# Patient Record
Sex: MALE | Race: WHITE | NOT HISPANIC OR LATINO | Employment: OTHER | URBAN - METROPOLITAN AREA
[De-identification: names, ages, dates, MRNs, and addresses within clinical notes are randomized per-mention and may not be internally consistent; named-entity substitution may affect disease eponyms.]

---

## 2017-06-12 ENCOUNTER — OFFICE VISIT (OUTPATIENT)
Dept: AUDIOLOGY | Facility: CLINIC | Age: 67
End: 2017-06-12
Payer: COMMERCIAL

## 2017-06-12 DIAGNOSIS — H90.3 SENSORINEURAL HEARING LOSS, ASYMMETRICAL: Primary | ICD-10-CM

## 2017-06-12 PROCEDURE — V5299 HEARING SERVICE: HCPCS | Performed by: AUDIOLOGIST

## 2017-06-12 NOTE — PROGRESS NOTES
67 year old male comes in for a hearing aid check. He reports his right Unitron 2013 Moxi Kiss hearing aid is not working. The hearing aids were purchased at another facility that went out of business.     Replaced right  and hearing aid is still dead. Listening check of the left hearing aid reveals distortion with limited gain.     Sent both hearing aids in for warranty repair (expires: 15/2/2017). See chart in the hearing aid room.     Patient will be contacted when the hearing aids are ready to be picked up.    Alicja MURRY, #[id number]

## 2017-06-12 NOTE — MR AVS SNAPSHOT
"              After Visit Summary   2017    Taj Velazquez    MRN: [id number]           Patient Information     Date Of Birth          1950        Visit Information        Provider Department      2017 2:00 PM Alicja Velázquez AuD [facility]        Today's Diagnoses     Sensorineural hearing loss, asymmetrical    -  1       Follow-ups after your visit        Who to contact     If you have questions or need follow up information about today's clinic visit or your schedule please contact [facility] directly at [phone].  Normal or non-critical lab and imaging results will be communicated to you by [employer], letter or phone within 4 business days after the clinic has received the results. If you do not hear from us within 7 days, please contact the clinic through [employer] or phone. If you have a critical or abnormal lab result, we will notify you by phone as soon as possible.  Submit refill requests through [employer] or call your pharmacy and they will forward the refill request to us. Please allow 3 business days for your refill to be completed.          Additional Information About Your Visit        MyChart Information     [employer] lets you send messages to your doctor, view your test results, renew your prescriptions, schedule appointments and more. To sign up, go to [email or web address] . Click on \"Log in\" on the left side of the screen, which will take you to the Welcome page. Then click on \"Sign up Now\" on the right side of the page.     You will be asked to enter the access code listed below, as well as some personal information. Please follow the directions to create your username and password.     Your access code is: [email or web address]  Expires: 9/10/2017  3:18 PM     Your access code will  in 90 days. If you need help or a new code, please call your [facility] or [phone].        Care EveryWhere ID     This is your Care EveryWhere ID. This could be used " by other organizations to access your [place] medical records  [id number]         Blood Pressure from Last 3 Encounters:   12/01/11 141/91    Weight from Last 3 Encounters:   12/01/11 240 lb (108.9 kg)              We Performed the Following     HEARING AID CHECK/NO CHARGE        Primary Care Provider Office Phone # Fax #    Javianaid David Carnes [phone]       [address] [address]        Thank you!     Thank you for choosing [facility]  for your care. Our goal is always to provide you with excellent care. Hearing back from our patients is one way we can continue to improve our services. Please take a few minutes to complete the written survey that you may receive in the mail after your visit with us. Thank you!             Your Updated Medication List - Protect others around you: Learn how to safely use, store and throw away your medicines at www.disposemymeds.org.          This list is accurate as of: 6/12/17  3:18 PM.  Always use your most recent med list.                   Brand Name Dispense Instructions for use    amLODIPine 5 MG tablet    NORVASC     Take 5 mg by mouth daily.       fluticasone 50 MCG/ACT spray    FLONASE     2 sprays by Both Nostrils route daily.       lisinopril-hydrochlorothiazide 20-25 MG per tablet    PRINZIDE/ZESTORETIC     Take 1 tablet by mouth daily.

## 2017-06-20 ENCOUNTER — OFFICE VISIT (OUTPATIENT)
Dept: AUDIOLOGY | Facility: CLINIC | Age: 67
End: 2017-06-20
Payer: COMMERCIAL

## 2017-06-20 DIAGNOSIS — H90.3 SENSORY HEARING LOSS, BILATERAL: Primary | ICD-10-CM

## 2017-06-20 PROCEDURE — V5299 HEARING SERVICE: HCPCS | Performed by: AUDIOLOGIST

## 2017-06-20 NOTE — MR AVS SNAPSHOT
"              After Visit Summary   2017    Taj Velazquez    MRN: [id number]           Patient Information     Date Of Birth          1950        Visit Information        Provider Department      2017 11:30 AM Alicja Velázquez AuD [facility]        Today's Diagnoses     Sensory hearing loss, bilateral    -  1       Follow-ups after your visit        Who to contact     If you have questions or need follow up information about today's clinic visit or your schedule please contact [facility] directly at [phone].  Normal or non-critical lab and imaging results will be communicated to you by [employer], letter or phone within 4 business days after the clinic has received the results. If you do not hear from us within 7 days, please contact the clinic through [employer] or phone. If you have a critical or abnormal lab result, we will notify you by phone as soon as possible.  Submit refill requests through [employer] or call your pharmacy and they will forward the refill request to us. Please allow 3 business days for your refill to be completed.          Additional Information About Your Visit        MyChart Information     [employer] lets you send messages to your doctor, view your test results, renew your prescriptions, schedule appointments and more. To sign up, go to [email or web address] . Click on \"Log in\" on the left side of the screen, which will take you to the Welcome page. Then click on \"Sign up Now\" on the right side of the page.     You will be asked to enter the access code listed below, as well as some personal information. Please follow the directions to create your username and password.     Your access code is: [email or web address]  Expires: 9/10/2017  3:18 PM     Your access code will  in 90 days. If you need help or a new code, please call your [facility]] or [phone].        Care EveryWhere ID     This is your Care EveryWhere ID. This could be used by other " organizations to access your [place] medical records  [id number]         Blood Pressure from Last 3 Encounters:   12/01/11 141/91    Weight from Last 3 Encounters:   12/01/11 240 lb (108.9 kg)              We Performed the Following     HEARING AID CHECK/NO CHARGE        Primary Care Provider Office Phone # Fax #    Javianaid David Carnes [phone]       [place] [address] [facility] [address]        Thank you!     Thank you for choosing [facility]  for your care. Our goal is always to provide you with excellent care. Hearing back from our patients is one way we can continue to improve our services. Please take a few minutes to complete the written survey that you may receive in the mail after your visit with us. Thank you!             Your Updated Medication List - Protect others around you: Learn how to safely use, store and throw away your medicines at www.disposemymeds.org.          This list is accurate as of: 6/20/17 11:51 AM.  Always use your most recent med list.                   Brand Name Dispense Instructions for use    amLODIPine 5 MG tablet    NORVASC     Take 5 mg by mouth daily.       fluticasone 50 MCG/ACT spray    FLONASE     2 sprays by Both Nostrils route daily.       lisinopril-hydrochlorothiazide 20-25 MG per tablet    PRINZIDE/ZESTORETIC     Take 1 tablet by mouth daily.

## 2017-06-20 NOTE — PROGRESS NOTES
67 year old male comes in to  his repaired 2013 Unitron Moxi Kiss hearing aids. The hearing aids were purchased at another facility.    Verified hearing aid functionality.  Reprogrammed hearing aids to use settings. See chart in the hearing aid room.     Will  hearing aids at the specialty clinic .     Return to clinic as needed.    Alicja MURRY, #[id number]

## 2017-11-27 ENCOUNTER — RECORDS - HEALTHEAST (OUTPATIENT)
Dept: ADMINISTRATIVE | Facility: OTHER | Age: 67
End: 2017-11-27

## 2017-12-07 ASSESSMENT — MIFFLIN-ST. JEOR: SCORE: 1839.48

## 2017-12-13 ENCOUNTER — SURGERY - HEALTHEAST (OUTPATIENT)
Dept: SURGERY | Facility: CLINIC | Age: 67
End: 2017-12-13

## 2017-12-13 ENCOUNTER — ANESTHESIA - HEALTHEAST (OUTPATIENT)
Dept: SURGERY | Facility: CLINIC | Age: 67
End: 2017-12-13

## 2017-12-13 ASSESSMENT — MIFFLIN-ST. JEOR
SCORE: 1839.48
SCORE: 1841.29

## 2018-11-19 ASSESSMENT — MIFFLIN-ST. JEOR: SCORE: 1839.48

## 2018-11-26 ENCOUNTER — ANESTHESIA - HEALTHEAST (OUTPATIENT)
Dept: SURGERY | Facility: CLINIC | Age: 68
End: 2018-11-26

## 2018-11-26 ENCOUNTER — SURGERY - HEALTHEAST (OUTPATIENT)
Dept: SURGERY | Facility: CLINIC | Age: 68
End: 2018-11-26

## 2018-11-26 ASSESSMENT — MIFFLIN-ST. JEOR: SCORE: 1862.72

## 2021-01-02 ENCOUNTER — HOSPITAL ENCOUNTER (EMERGENCY)
Facility: CLINIC | Age: 71
Discharge: ANOTHER HOSPITAL | End: 2021-01-02
Attending: FAMILY MEDICINE | Admitting: FAMILY MEDICINE
Payer: COMMERCIAL

## 2021-01-02 ENCOUNTER — APPOINTMENT (OUTPATIENT)
Dept: CT IMAGING | Facility: CLINIC | Age: 71
End: 2021-01-02
Attending: FAMILY MEDICINE
Payer: COMMERCIAL

## 2021-01-02 ENCOUNTER — APPOINTMENT (OUTPATIENT)
Dept: GENERAL RADIOLOGY | Facility: CLINIC | Age: 71
End: 2021-01-02
Attending: FAMILY MEDICINE
Payer: COMMERCIAL

## 2021-01-02 VITALS
HEART RATE: 79 BPM | DIASTOLIC BLOOD PRESSURE: 85 MMHG | HEIGHT: 72 IN | OXYGEN SATURATION: 95 % | TEMPERATURE: 98.3 F | RESPIRATION RATE: 17 BRPM | BODY MASS INDEX: 31.15 KG/M2 | WEIGHT: 230 LBS | SYSTOLIC BLOOD PRESSURE: 138 MMHG

## 2021-01-02 DIAGNOSIS — R09.02 HYPOXIA: ICD-10-CM

## 2021-01-02 DIAGNOSIS — U07.1 CLINICAL DIAGNOSIS OF COVID-19: ICD-10-CM

## 2021-01-02 PROBLEM — R33.9 RETENTION OF URINE: Status: ACTIVE | Noted: 2018-11-27

## 2021-01-02 PROBLEM — D62 ANEMIA DUE TO BLOOD LOSS, ACUTE: Status: ACTIVE | Noted: 2018-11-27

## 2021-01-02 PROBLEM — Z87.891 HISTORY OF TOBACCO USE DISORDER: Status: ACTIVE | Noted: 2018-11-26

## 2021-01-02 LAB
ALBUMIN SERPL-MCNC: 2.5 G/DL (ref 3.4–5)
ALP SERPL-CCNC: 72 U/L (ref 40–150)
ALT SERPL W P-5'-P-CCNC: 125 U/L (ref 0–70)
ANION GAP SERPL CALCULATED.3IONS-SCNC: 6 MMOL/L (ref 3–14)
AST SERPL W P-5'-P-CCNC: 77 U/L (ref 0–45)
BASOPHILS # BLD AUTO: 0 10E9/L (ref 0–0.2)
BASOPHILS NFR BLD AUTO: 0.2 %
BILIRUB SERPL-MCNC: 2 MG/DL (ref 0.2–1.3)
BUN SERPL-MCNC: 20 MG/DL (ref 7–30)
CALCIUM SERPL-MCNC: 8.1 MG/DL (ref 8.5–10.1)
CHLORIDE SERPL-SCNC: 95 MMOL/L (ref 94–109)
CO2 SERPL-SCNC: 30 MMOL/L (ref 20–32)
CREAT SERPL-MCNC: 0.89 MG/DL (ref 0.66–1.25)
D DIMER PPP FEU-MCNC: 2.4 UG/ML FEU (ref 0–0.5)
DIFFERENTIAL METHOD BLD: ABNORMAL
EOSINOPHIL # BLD AUTO: 0.1 10E9/L (ref 0–0.7)
EOSINOPHIL NFR BLD AUTO: 1.3 %
ERYTHROCYTE [DISTWIDTH] IN BLOOD BY AUTOMATED COUNT: 12 % (ref 10–15)
FLUABV+SARS-COV-2+RSV PNL RESP NAA+PROBE: NEGATIVE
FLUABV+SARS-COV-2+RSV PNL RESP NAA+PROBE: NEGATIVE
GFR SERPL CREATININE-BSD FRML MDRD: 87 ML/MIN/{1.73_M2}
GLUCOSE SERPL-MCNC: 281 MG/DL (ref 70–99)
HCT VFR BLD AUTO: 35.6 % (ref 40–53)
HGB BLD-MCNC: 12.6 G/DL (ref 13.3–17.7)
IMM GRANULOCYTES # BLD: 0 10E9/L (ref 0–0.4)
IMM GRANULOCYTES NFR BLD: 0.5 %
LABORATORY COMMENT REPORT: ABNORMAL
LYMPHOCYTES # BLD AUTO: 0.5 10E9/L (ref 0.8–5.3)
LYMPHOCYTES NFR BLD AUTO: 8.6 %
MCH RBC QN AUTO: 30 PG (ref 26.5–33)
MCHC RBC AUTO-ENTMCNC: 35.4 G/DL (ref 31.5–36.5)
MCV RBC AUTO: 85 FL (ref 78–100)
MONOCYTES # BLD AUTO: 0.7 10E9/L (ref 0–1.3)
MONOCYTES NFR BLD AUTO: 11.4 %
NEUTROPHILS # BLD AUTO: 4.7 10E9/L (ref 1.6–8.3)
NEUTROPHILS NFR BLD AUTO: 78 %
NRBC # BLD AUTO: 0 10*3/UL
NRBC BLD AUTO-RTO: 0 /100
PLATELET # BLD AUTO: 267 10E9/L (ref 150–450)
POTASSIUM SERPL-SCNC: 3.9 MMOL/L (ref 3.4–5.3)
PROT SERPL-MCNC: 7.3 G/DL (ref 6.8–8.8)
RBC # BLD AUTO: 4.2 10E12/L (ref 4.4–5.9)
RSV RNA SPEC QL NAA+PROBE: ABNORMAL
SARS-COV-2 RNA SPEC QL NAA+PROBE: POSITIVE
SODIUM SERPL-SCNC: 131 MMOL/L (ref 133–144)
SPECIMEN SOURCE: ABNORMAL
TROPONIN I SERPL-MCNC: <0.015 UG/L (ref 0–0.04)
WBC # BLD AUTO: 6.1 10E9/L (ref 4–11)

## 2021-01-02 PROCEDURE — 85025 COMPLETE CBC W/AUTO DIFF WBC: CPT | Performed by: FAMILY MEDICINE

## 2021-01-02 PROCEDURE — 87636 SARSCOV2 & INF A&B AMP PRB: CPT | Performed by: FAMILY MEDICINE

## 2021-01-02 PROCEDURE — 96365 THER/PROPH/DIAG IV INF INIT: CPT | Mod: 59 | Performed by: FAMILY MEDICINE

## 2021-01-02 PROCEDURE — 258N000003 HC RX IP 258 OP 636: Performed by: FAMILY MEDICINE

## 2021-01-02 PROCEDURE — 71045 X-RAY EXAM CHEST 1 VIEW: CPT

## 2021-01-02 PROCEDURE — 99285 EMERGENCY DEPT VISIT HI MDM: CPT | Mod: 25 | Performed by: FAMILY MEDICINE

## 2021-01-02 PROCEDURE — 250N000011 HC RX IP 250 OP 636: Performed by: FAMILY MEDICINE

## 2021-01-02 PROCEDURE — 96375 TX/PRO/DX INJ NEW DRUG ADDON: CPT | Mod: 59 | Performed by: FAMILY MEDICINE

## 2021-01-02 PROCEDURE — 85379 FIBRIN DEGRADATION QUANT: CPT | Performed by: FAMILY MEDICINE

## 2021-01-02 PROCEDURE — 84484 ASSAY OF TROPONIN QUANT: CPT | Performed by: FAMILY MEDICINE

## 2021-01-02 PROCEDURE — 94640 AIRWAY INHALATION TREATMENT: CPT | Performed by: FAMILY MEDICINE

## 2021-01-02 PROCEDURE — 71275 CT ANGIOGRAPHY CHEST: CPT

## 2021-01-02 PROCEDURE — 250N000013 HC RX MED GY IP 250 OP 250 PS 637: Performed by: FAMILY MEDICINE

## 2021-01-02 PROCEDURE — 250N000009 HC RX 250: Performed by: FAMILY MEDICINE

## 2021-01-02 PROCEDURE — 93010 ELECTROCARDIOGRAM REPORT: CPT | Performed by: FAMILY MEDICINE

## 2021-01-02 PROCEDURE — 93005 ELECTROCARDIOGRAM TRACING: CPT | Performed by: FAMILY MEDICINE

## 2021-01-02 PROCEDURE — C9803 HOPD COVID-19 SPEC COLLECT: HCPCS | Performed by: FAMILY MEDICINE

## 2021-01-02 PROCEDURE — 80053 COMPREHEN METABOLIC PANEL: CPT | Performed by: FAMILY MEDICINE

## 2021-01-02 RX ORDER — DEXAMETHASONE SODIUM PHOSPHATE 10 MG/ML
10 INJECTION, SOLUTION INTRAMUSCULAR; INTRAVENOUS ONCE
Status: DISCONTINUED | OUTPATIENT
Start: 2021-01-02 | End: 2021-01-02

## 2021-01-02 RX ORDER — ALBUTEROL SULFATE 90 UG/1
2 AEROSOL, METERED RESPIRATORY (INHALATION) EVERY 6 HOURS PRN
Status: DISCONTINUED | OUTPATIENT
Start: 2021-01-02 | End: 2021-01-02 | Stop reason: HOSPADM

## 2021-01-02 RX ORDER — LOSARTAN POTASSIUM 25 MG/1
25 TABLET ORAL DAILY
COMMUNITY
End: 2024-01-28

## 2021-01-02 RX ORDER — DEXAMETHASONE SODIUM PHOSPHATE 10 MG/ML
6 INJECTION, SOLUTION INTRAMUSCULAR; INTRAVENOUS ONCE
Status: COMPLETED | OUTPATIENT
Start: 2021-01-02 | End: 2021-01-02

## 2021-01-02 RX ORDER — IOPAMIDOL 755 MG/ML
95 INJECTION, SOLUTION INTRAVASCULAR ONCE
Status: COMPLETED | OUTPATIENT
Start: 2021-01-02 | End: 2021-01-02

## 2021-01-02 RX ADMIN — IOPAMIDOL 95 ML: 755 INJECTION, SOLUTION INTRAVENOUS at 15:17

## 2021-01-02 RX ADMIN — REMDESIVIR 200 MG: 100 INJECTION, POWDER, LYOPHILIZED, FOR SOLUTION INTRAVENOUS at 17:00

## 2021-01-02 RX ADMIN — DEXAMETHASONE SODIUM PHOSPHATE 6 MG: 10 INJECTION, SOLUTION INTRAMUSCULAR; INTRAVENOUS at 16:51

## 2021-01-02 RX ADMIN — ALBUTEROL SULFATE 2 PUFF: 90 AEROSOL, METERED RESPIRATORY (INHALATION) at 13:52

## 2021-01-02 ASSESSMENT — ENCOUNTER SYMPTOMS
COUGH: 0
FEVER: 0
DIAPHORESIS: 0
FREQUENCY: 0
DIARRHEA: 1
HEADACHES: 1
APPETITE CHANGE: 1
SHORTNESS OF BREATH: 1
WHEEZING: 0
NAUSEA: 0
PALPITATIONS: 0
ABDOMINAL PAIN: 0
VOMITING: 0
CONSTIPATION: 0
SORE THROAT: 0
BLOOD IN STOOL: 0
DYSURIA: 0
CHILLS: 0
SINUS PRESSURE: 0
FATIGUE: 1

## 2021-01-02 ASSESSMENT — MIFFLIN-ST. JEOR: SCORE: 1851.27

## 2021-01-02 NOTE — ED TRIAGE NOTES
penny from home  Healthy male  Contact with his son 12/19 who was symptomatic and tested positive the following day  Loss of appetite, fatigue, dry cough, hard to breathe in, can't catch his breath  Generalized weakness  States 2 weeks of symptoms  89% on RA with exertion  98% 3L  92% RA at rest  , states ws borderline diabetic 15 years ago   States he sees his PCP every year  Only drinking water for over a week, unable to take in food

## 2021-01-02 NOTE — ED PROVIDER NOTES
History   No chief complaint on file.    HPI  Taj Velazquez is a 70 year old male who presents with exposures to Covid in the family starting with onset before 20 December of exposure and then as of 20 December experienced onset of cough shortness of breath congestion and progressive symptoms that mostly became worse in the last several days.  His cough results some episodes of lower chest pain with this.  No palpitations.  No sweats.  No change in sense of smell or taste.  Diarrhea.  No vomiting.  Decreased appetite decreased p.o. intake.  Has had progressive dyspnea on exertion and difficulty doing any activity at home.    Oxygen saturations on arrival after activity was 88% at rest she was as low as 90% and when I go to the room to evaluate him he is on oxygen 2 L at 95%        Allergies:  Allergies   Allergen Reactions     Nkda [No Known Drug Allergies]        Problem List:    There are no active problems to display for this patient.       Past Medical History:    No past medical history on file.    Past Surgical History:    Past Surgical History:   Procedure Laterality Date     COLONOSCOPY  12/1/2011    Procedure:COLONOSCOPY; Colonoscopy; Surgeon:GWYN MELVIN; Location:[facility]       Family History:    No family history on file.    Social History:  Marital Status:   [2]  Social History     Tobacco Use     Smoking status: Not on file   Substance Use Topics     Alcohol use: Not on file     Drug use: Not on file        Medications:         amLODIPine (NORVASC) 5 MG tablet       fluticasone (FLONASE) 50 MCG/ACT nasal spray       lisinopril-hydrochlorothiazide (PRINZIDE,ZESTORETIC) 20-25 MG per tablet          Review of Systems   Constitutional: Positive for appetite change and fatigue. Negative for chills, diaphoresis and fever.   HENT: Positive for congestion. Negative for ear pain, sinus pressure and sore throat.    Eyes: Negative for visual disturbance.   Respiratory: Positive for shortness of  breath. Negative for cough and wheezing.    Cardiovascular: Negative for chest pain and palpitations.   Gastrointestinal: Positive for diarrhea. Negative for abdominal pain, blood in stool, constipation, nausea and vomiting.   Genitourinary: Negative for dysuria, frequency and urgency.   Skin: Negative for rash.   Neurological: Positive for headaches.   All other systems reviewed and are negative.      Physical Exam   BP: (!) 147/78  Pulse: 90  Resp: 22  SpO2: 93 %      Physical Exam  Constitutional:       General: He is in acute distress.      Appearance: He is not diaphoretic.   HENT:      Head: Atraumatic.      Nose: No rhinorrhea.   Eyes:      Conjunctiva/sclera: Conjunctivae normal.   Neck:      Musculoskeletal: Neck supple.   Cardiovascular:      Rate and Rhythm: Normal rate and regular rhythm.      Heart sounds: No murmur.   Pulmonary:      Effort: Tachypnea and respiratory distress present.      Breath sounds: No stridor. No wheezing or rhonchi.   Abdominal:      General: Abdomen is flat. Bowel sounds are normal. There is no distension.      Palpations: There is no mass.      Tenderness: There is no abdominal tenderness. There is no guarding.   Musculoskeletal:      Right lower leg: No edema.      Left lower leg: No edema.   Skin:     Coloration: Skin is not pale.      Findings: No rash.   Neurological:      General: No focal deficit present.      Mental Status: He is alert.      Motor: No weakness.         ED Course        Procedures                 EKG Interpretation:      Interpreted by David Ro MD  EKG done at 1416 hrs. demonstrates a sinus rhythm of 87 bpm with a normal axis and no ST change.  No T wave changes.  Poor R progression V1 through V3.  No ectopy.  Normal conduction.  Normal intervals.  Impression sinus rhythm 87 bpm likely prior anterior septal MI.  No prior EKG.    Critical Care time:  none               Results for orders placed or performed during the hospital encounter of 01/02/21  (from the past 24 hour(s))   CBC with platelets differential   Result Value Ref Range    WBC 6.1 4.0 - 11.0 10e9/L    RBC Count 4.20 (L) 4.4 - 5.9 10e12/L    Hemoglobin 12.6 (L) 13.3 - 17.7 g/dL    Hematocrit 35.6 (L) 40.0 - 53.0 %    MCV 85 78 - 100 fl    MCH 30.0 26.5 - 33.0 pg    MCHC 35.4 31.5 - 36.5 g/dL    RDW 12.0 10.0 - 15.0 %    Platelet Count 267 150 - 450 10e9/L    Diff Method Automated Method     % Neutrophils 78.0 %    % Lymphocytes 8.6 %    % Monocytes 11.4 %    % Eosinophils 1.3 %    % Basophils 0.2 %    % Immature Granulocytes 0.5 %    Nucleated RBCs 0 0 /100    Absolute Neutrophil 4.7 1.6 - 8.3 10e9/L    Absolute Lymphocytes 0.5 (L) 0.8 - 5.3 10e9/L    Absolute Monocytes 0.7 0.0 - 1.3 10e9/L    Absolute Eosinophils 0.1 0.0 - 0.7 10e9/L    Absolute Basophils 0.0 0.0 - 0.2 10e9/L    Abs Immature Granulocytes 0.0 0 - 0.4 10e9/L    Absolute Nucleated RBC 0.0    Comprehensive metabolic panel   Result Value Ref Range    Sodium 131 (L) 133 - 144 mmol/L    Potassium 3.9 3.4 - 5.3 mmol/L    Chloride 95 94 - 109 mmol/L    Carbon Dioxide 30 20 - 32 mmol/L    Anion Gap 6 3 - 14 mmol/L    Glucose 281 (H) 70 - 99 mg/dL    Urea Nitrogen 20 7 - 30 mg/dL    Creatinine 0.89 0.66 - 1.25 mg/dL    GFR Estimate 87 >60 mL/min/[1.73_m2]    GFR Estimate If Black >90 >60 mL/min/[1.73_m2]    Calcium 8.1 (L) 8.5 - 10.1 mg/dL    Bilirubin Total 2.0 (H) 0.2 - 1.3 mg/dL    Albumin 2.5 (L) 3.4 - 5.0 g/dL    Protein Total 7.3 6.8 - 8.8 g/dL    Alkaline Phosphatase 72 40 - 150 U/L     (H) 0 - 70 U/L    AST 77 (H) 0 - 45 U/L   Symptomatic Influenza A/B & SARS-CoV2 (COVID-19) Virus PCR Multiplex    Specimen: Nasopharyngeal   Result Value Ref Range    Flu A/B & SARS-COV-2 PCR Source Nasopharyngeal     SARS-CoV-2 PCR Result POSITIVE (AA)     Influenza A PCR Negative NEG^Negative    Influenza B PCR Negative NEG^Negative    Respiratory Syncytial Virus PCR (Note)     Flu A/B & SARS-CoV-2 PCR Comment (Note)    D dimer quantitative    Result Value Ref Range    D Dimer 2.4 (H) 0.0 - 0.50 ug/ml FEU   Troponin I   Result Value Ref Range    Troponin I ES <0.015 0.000 - 0.045 ug/L   XR Chest Port 1 View    Narrative    CHEST PORTABLE ONE VIEW   1/2/2021 1:40 PM     HISTORY: Dyspnea.    COMPARISON: None available      Impression    IMPRESSION: Single portable AP view of the chest was obtained. Stable  cardiomediastinal silhouette. Bilateral patchy airspace pulmonary  opacities, worrisome for infection including atypical infection like  COVID-19.    FABY SILVA MD   CT Chest Pulmonary Embolism w Contrast    Narrative    CT CHEST PULMONARY EMBOLISM WITH CONTRAST  1/2/2021 3:27 PM    HISTORY:  Dyspnea, COVID.    TECHNIQUE: Scans obtained from the apices through the diaphragm with  IV contrast. 95 mL Isovue-370 IV injected. Radiation dose for this  scan was reduced using automated exposure control, adjustment of the  mA and/or kV according to patient size, or iterative reconstruction  technique.    COMPARISON:  None available    FINDINGS:  Chest/mediastinum: No evidence of pulmonary embolism. No cardiomegaly  or significant pericardial effusion. Multiple enlarged mediastinal and  hilar lymph nodes, likely reactive.    Lungs and pleura: No pleural effusion or pneumothorax. Multiple patchy  basilar and peripheral predominant patchy pulmonary opacities,  worrisome for infection, including atypical infection like COVID-19.    Upper abdomen: Limited evaluation of the upper abdomen due to lack of  coverage and contrast. Diffuse hypoattenuation of the liver, likely  due to underlying hepatic steatosis. The spleen is enlarged measuring  at least 15.1 cm in craniocaudal dimension.    Bones and soft tissue: Multilevel degenerative changes of the spine.  No suspicious osseous lesion.      Impression    IMPRESSION:   1. No evidence of pulmonary embolism.  2. Extensive bilateral basilar and peripheral predominant patchy  airspace pulmonary opacities, worrisome  for infection, including  atypical infection like COVID-19.  3. Multiple enlarged mediastinal and hilar lymph nodes, likely  reactive.  4. Significant hepatic steatosis.  5. Splenomegaly.    FABY SILVA MD       Medications   albuterol (PROAIR HFA/PROVENTIL HFA/VENTOLIN HFA) 108 (90 Base) MCG/ACT inhaler 2 puff (2 puffs Inhalation Given 1/2/21 1352)   remdesivir 200 mg in sodium chloride 0.9 % 250 mL intermittent infusion (200 mg Intravenous New Bag 1/2/21 1700)     Followed by   remdesivir 100 mg in sodium chloride 0.9 % 250 mL intermittent infusion (has no administration in time range)   iopamidol (ISOVUE-370) solution 95 mL (95 mLs Intravenous Given 1/2/21 1517)   dexamethasone PF (DECADRON) injection 6 mg (6 mg Intravenous Given 1/2/21 1651)       Assessments & Plan (with Medical Decision Making)     MDM; Taj Velazquez is a 70 year old male who presents with acute Covid-like symptoms and exposure that occurred around December 20.  Then with developing increasing dyspnea on exertion cough in the last several days.  Will evaluate more broadly for his dyspnea.  His oxygen requirements are borderline here and may require hospitalization.  Will have him ambulate with oxygen saturation monitoring off of oxygen.     Patient has several risk factors for worse prognosis with COVID-19 including likely diabetes undiagnosed with elevated glucose to 218 prior diagnosis of prediabetes, age of 70 years old.  Likely fatty liver.  elevated LFTs -likely nonalcoholic fatty liver from his history.  He is hypoxic down to 88% on room air with any activity and at rest drops to about 90% when he is just lying in bed not doing anything.  He is improved on supplemental oxygen.  He is given Decadron and remdesivir.  He is given an albuterol inhaler which does not significantly modify hypoxia.  CT demonstrates changes consistent with COVID-19.    I discussed with the patient that we do not hospital beds.  I discussed with Covid  triage Dr. Yates, I have re accepts for transfer to [facility].  Discussed with the patient and he understands the need to transfer as we do not have beds.  I do not know how long he will be in the hospital for.  He may be able to leave on supplemental oxygen.  But at this point I feel that he is unsafe for discharge on supplemental oxygen due to his symptomatic ambulation and hypoxia currently along with his underlying risks.    Viewed the nursing notes.    I have reviewed the findings, diagnosis, plan and need for follow up with the patient.       New Prescriptions    No medications on file       Final diagnoses:   Clinical diagnosis of COVID-19   Hypoxia       1/2/2021   [facility] EMERGENCY DEPT     David Ro MD  01/02/21 6455

## 2021-04-05 ENCOUNTER — MEDICAL CORRESPONDENCE (OUTPATIENT)
Dept: HEALTH INFORMATION MANAGEMENT | Facility: CLINIC | Age: 71
End: 2021-04-05

## 2021-04-06 ENCOUNTER — TRANSCRIBE ORDERS (OUTPATIENT)
Dept: OTHER | Age: 71
End: 2021-04-06

## 2021-04-06 DIAGNOSIS — Z12.11 SCREEN FOR COLON CANCER: Primary | ICD-10-CM

## 2021-04-07 ENCOUNTER — HOSPITAL ENCOUNTER (OUTPATIENT)
Facility: CLINIC | Age: 71
End: 2021-04-07
Attending: SURGERY | Admitting: SURGERY
Payer: COMMERCIAL

## 2021-04-07 DIAGNOSIS — Z11.59 ENCOUNTER FOR SCREENING FOR OTHER VIRAL DISEASES: ICD-10-CM

## 2021-04-12 DIAGNOSIS — Z11.59 ENCOUNTER FOR SCREENING FOR OTHER VIRAL DISEASES: ICD-10-CM

## 2021-04-12 PROCEDURE — U0005 INFEC AGEN DETEC AMPLI PROBE: HCPCS | Performed by: SURGERY

## 2021-04-12 PROCEDURE — U0003 INFECTIOUS AGENT DETECTION BY NUCLEIC ACID (DNA OR RNA); SEVERE ACUTE RESPIRATORY SYNDROME CORONAVIRUS 2 (SARS-COV-2) (CORONAVIRUS DISEASE [COVID-19]), AMPLIFIED PROBE TECHNIQUE, MAKING USE OF HIGH THROUGHPUT TECHNOLOGIES AS DESCRIBED BY CMS-2020-01-R: HCPCS | Performed by: SURGERY

## 2021-04-13 LAB
LABORATORY COMMENT REPORT: ABNORMAL
SARS-COV-2 RNA RESP QL NAA+PROBE: NORMAL
SARS-COV-2 RNA RESP QL NAA+PROBE: POSITIVE
SPECIMEN SOURCE: ABNORMAL
SPECIMEN SOURCE: NORMAL

## 2021-04-13 RX ORDER — AMOXICILLIN 500 MG/1
CAPSULE ORAL
COMMUNITY
Start: 2020-06-29 | End: 2021-04-14 | Stop reason: HOSPADM

## 2021-04-14 ENCOUNTER — TELEPHONE (OUTPATIENT)
Dept: LAB | Facility: CLINIC | Age: 71
End: 2021-04-14

## 2021-04-14 NOTE — TELEPHONE ENCOUNTER
"Coronavirus (COVID-19) Notification    Caller Name (Patient, parent, daughter/son, grandparent, etc)  patient    Reason for call  Notify of Positive Coronavirus (COVID-19) lab results, assess symptoms,  review [facility] recommendations    Lab Result    Lab test:  2019-nCoV rRt-PCR or SARS-CoV-2 PCR    Oropharyngeal AND/OR nasopharyngeal swabs is POSITIVE for 2019-nCoV RNA/SARS-COV-2 PCR (COVID-19 virus)    RN Recommendations/Instructions per [facility] Coronavirus COVID-19 recommendations    Brief introduction script  Introduce self then review script:  \"I am calling on behalf of [employer].  We were notified that your Coronavirus test (COVID-19) for was POSITIVE for the virus.  I have some information to relay to you but first I wanted to mention that the [place] Dept of Health will be contacting you shortly [it's possible MD already called Patient] to talk to you more about how you are feeling and other people you have had contact with who might now also have the virus.  Also,  [facility] is Partnering with the [facility] for Covid-19 research, you may be contacted directly by research staff.\"    Assessment (Inquire about Patient's current symptoms)   Assessment   Current Symptoms at time of phone call: (if no symptoms, document No symptoms] No symptoms   Symptoms onset (if applicable) n/a     If at time of call, Patients symptoms hare worsened, the Patient should contact 911 or have someone drive them to Emergency Dept promptly:      If Patient calling 911, inform 911 personal that you have tested positive for the Coronavirus (COVID-19).  Place mask on and await 911 to arrive.  If Emergency Dept, If possible, please have another adult drive you to the Emergency Dept but you need to wear mask when in contact with other people.      Patient declines any education @ this time and is instructed to contact the surgeon whom ordered his Covid test.    (Exception, no letters sent to " Presurgerical/Preprocedure Patients)    Mone Mai, ONEILN

## 2021-05-31 VITALS — BODY MASS INDEX: 32.48 KG/M2 | HEIGHT: 71 IN | WEIGHT: 232 LBS

## 2021-06-01 ENCOUNTER — RECORDS - HEALTHEAST (OUTPATIENT)
Dept: ADMINISTRATIVE | Facility: CLINIC | Age: 71
End: 2021-06-01

## 2021-06-02 VITALS — BODY MASS INDEX: 33.2 KG/M2 | HEIGHT: 71 IN | WEIGHT: 237.13 LBS

## 2021-06-14 NOTE — ANESTHESIA PROCEDURE NOTES
Peripheral Block    Patient location during procedure: pre-op  Start time: 12/13/2017 6:57 AM  End time: 12/13/2017 7:00 AM  post-op analgesia per surgeon order as noted in medical record  Staffing:  Performing  Anesthesiologist: SURINDER SÁNCHEZ  Preanesthetic Checklist  Completed: patient identified, site marked, risks, benefits, and alternatives discussed, timeout performed, consent obtained, airway assessed, oxygen available, suction available, emergency drugs available and hand hygiene performed  Peripheral Block  Block type: other, tibial  Prep: ChloraPrep  Patient position: supine  Patient monitoring: cardiac monitor, continuous pulse oximetry, heart rate and blood pressure  Laterality: left  Injection technique: ultrasound guided          Needle  Needle type: Stimuplex   Needle gauge: 21 G  Needle length: 4 in  no peripheral nerve catheter placed  Assessment  Injection assessment: no difficulty with injection, negative aspiration for heme, no paresthesia on injection and incremental injection

## 2021-06-14 NOTE — ANESTHESIA POSTPROCEDURE EVALUATION
Patient: Taj Velazquez Sr.  LEFT TOTAL KNEE ARTHROPLASTY  Anesthesia type: spinal    Patient location: PACU  Last vitals:   Vitals:    12/13/17 1030   BP: 147/85   Pulse: 77   Resp: 16   Temp:    SpO2: 96%     Post vital signs: stable  Level of consciousness: awake and responds to simple questions  Post-anesthesia pain: pain controlled  Post-anesthesia nausea and vomiting: no  Pulmonary: unassisted, return to baseline  Cardiovascular: stable and blood pressure at baseline  Hydration: adequate  Anesthetic events: no    QCDR Measures:  ASA# 11 - Bonnie-op Cardiac Arrest: ASA11B - Patient did NOT experience unanticipated cardiac arrest  ASA# 12 - Bonnie-op Mortality Rate: ASA12B - Patient did NOT die  ASA# 13 - PACU Re-Intubation Rate: NA - No ETT / LMA used for case  ASA# 10 - Composite Anes Safety: ASA10A - No serious adverse event    Additional Notes:

## 2021-06-14 NOTE — ANESTHESIA PROCEDURE NOTES
Spinal Block    Patient location during procedure: OR  Start time: 12/13/2017 7:33 AM  End time: 12/13/2017 7:36 AM    Staffing:  Performing  Anesthesiologist: SURINDER SÁNCHEZ    Preanesthetic Checklist  Completed: patient identified, risks, benefits, and alternatives discussed, timeout performed, consent obtained, airway assessed, oxygen available, suction available, emergency drugs available and hand hygiene performed  Spinal Block  Patient position: sitting  Prep: ChloraPrep  Patient monitoring: heart rate, cardiac monitor, continuous pulse ox and blood pressure  Approach: midline  Location: L3-4  Injection technique: single-shot  Needle type: pencil-tip   Needle gauge: 24 G      Additional Notes:  Single pass. Neg paresthesia, neg. Heme.  Clear CSF

## 2021-06-14 NOTE — ANESTHESIA CARE TRANSFER NOTE
Last vitals:   Vitals:    12/13/17 0710   BP: 138/78   Pulse: 68   Resp: 15   Temp:    SpO2: 97%     Patient's level of consciousness is awake  Spontaneous respirations: yes  Maintains airway independently: yes  Dentition unchanged: yes  Oropharynx: oropharynx clear of all foreign objects    QCDR Measures:  ASA# 20 - Surgical Safety Checklist: WHO surgical safety checklist completed prior to induction  PQRS# 430 - Adult PONV Prevention: 4558F - Pt received => 2 anti-emetic agents (different classes) preop & intraop  ASA# 8 - Peds PONV Prevention: NA - Not pediatric patient, not GA or 2 or more risk factors NOT present  PQRS# 424 - Bonnie-op Temp Management: 4559F - At least one body temp DOCUMENTED => 35.5C or 95.9F within required timeframe  PQRS# 426 - PACU Transfer Protocol: - Transfer of care checklist used  ASA# 14 - Acute Post-op Pain: ASA14B - Patient did NOT experience pain >= 7 out of 10

## 2021-06-14 NOTE — ANESTHESIA PREPROCEDURE EVALUATION
Anesthesia Evaluation      Patient summary reviewed   No history of anesthetic complications     Airway   Mallampati: II  Neck ROM: full   Pulmonary - negative ROS and normal exam    breath sounds clear to auscultation                         Cardiovascular - negative ROS and normal exam  Exercise tolerance: > or = 4 METS  (+) hypertension well controlled, ,     ECG reviewed  Rhythm: regular  Rate: normal,         Neuro/Psych - negative ROS     Endo/Other - negative ROS      GI/Hepatic/Renal - negative ROS           Dental - normal exam                        Anesthesia Plan  Planned anesthetic: spinal and peripheral nerve block  Saphenous. n and Tibial n. Blocks for POA as requested by Dr. Baker  ASA 3     Anesthetic plan and risks discussed with: patient    Post-op plan: routine recovery

## 2021-06-14 NOTE — ANESTHESIA PROCEDURE NOTES
Peripheral Block    Patient location during procedure: pre-op  Start time: 12/13/2017 7:01 AM  End time: 12/13/2017 7:04 AM  post-op analgesia per surgeon order as noted in medical record  Staffing:  Performing  Anesthesiologist: SURINDER SÁNCHEZ  Preanesthetic Checklist  Completed: patient identified, site marked, risks, benefits, and alternatives discussed, timeout performed, consent obtained, airway assessed, oxygen available, suction available, emergency drugs available and hand hygiene performed  Peripheral Block  Block type: saphenous  Prep: ChloraPrep  Patient position: supine  Patient monitoring: cardiac monitor, continuous pulse oximetry, heart rate and blood pressure  Laterality: left  Injection technique: ultrasound guided          Needle  Needle type: Stimuplex   Needle gauge: 21 G  Needle length: 4 in  no peripheral nerve catheter placed  Assessment  Injection assessment: no difficulty with injection, negative aspiration for heme, no paresthesia on injection and incremental injection

## 2021-06-21 NOTE — ANESTHESIA PROCEDURE NOTES
Peripheral Block    Patient location during procedure: pre-op  Start time: 11/26/2018 10:36 AM  End time: 11/26/2018 10:39 AM  post-op analgesia per surgeon order as noted in medical record  Staffing:  Performing  Anesthesiologist: Endy Lopez MD  Preanesthetic Checklist  Completed: patient identified, site marked, risks, benefits, and alternatives discussed, timeout performed, consent obtained, airway assessed, oxygen available, suction available, emergency drugs available and hand hygiene performed  Peripheral Block  Block type: other, tibial  Prep: ChloraPrep  Patient position: supine  Patient monitoring: cardiac monitor, continuous pulse oximetry, heart rate and blood pressure  Laterality: right  Injection technique: ultrasound guided            Needle  Needle type: Stimuplex   Needle gauge: 20G  Needle length: 4 in  no peripheral nerve catheter placed  Assessment  Injection assessment: no difficulty with injection, negative aspiration for heme, no paresthesia on injection and incremental injection

## 2021-06-21 NOTE — ANESTHESIA POSTPROCEDURE EVALUATION
Patient: Taj Velazquez Sr.  RIGHT TOTAL KNEE ARTHROPLASTY  Anesthesia type: general    Patient location: PACU  Last vitals:   Vitals:    11/26/18 1320   BP: 159/77   Pulse: 75   Resp: 12   Temp:    SpO2: 95%     Post vital signs: stable  Level of consciousness: awake and responds to simple questions  Post-anesthesia pain: pain controlled  Post-anesthesia nausea and vomiting: no  Pulmonary: unassisted, return to baseline  Cardiovascular: stable and blood pressure at baseline  Hydration: adequate  Anesthetic events: no    QCDR Measures:  ASA# 11 - Bonnie-op Cardiac Arrest: ASA11B - Patient did NOT experience unanticipated cardiac arrest  ASA# 12 - Bonnie-op Mortality Rate: ASA12B - Patient did NOT die  ASA# 13 - PACU Re-Intubation Rate: ASA13B - Patient did NOT require a new airway mgmt  ASA# 10 - Composite Anes Safety: ASA10A - No serious adverse event    Additional Notes:

## 2021-06-21 NOTE — ANESTHESIA CARE TRANSFER NOTE
Last vitals:   Vitals:    11/26/18 1315   BP: 167/79   Pulse: 87   Resp: 16   Temp: 36.6  C (97.9  F)   SpO2: 96%     Patient's level of consciousness is drowsy  Spontaneous respirations: yes  Maintains airway independently: yes  Dentition unchanged: yes  Oropharynx: oropharynx clear of all foreign objects    QCDR Measures:  ASA# 20 - Surgical Safety Checklist: WHO surgical safety checklist completed prior to induction    PQRS# 430 - Adult PONV Prevention: 4558F - Pt received => 2 anti-emetic agents (different classes) preop & intraop  ASA# 8 - Peds PONV Prevention: NA - Not pediatric patient, not GA or 2 or more risk factors NOT present  PQRS# 424 - Bonnie-op Temp Management: 4559F - At least one body temp DOCUMENTED => 35.5C or 95.9F within required timeframe  PQRS# 426 - PACU Transfer Protocol: - Transfer of care checklist used  ASA# 14 - Acute Post-op Pain: ASA14A - Patient experienced pain >= 7 out of 10

## 2021-06-21 NOTE — ANESTHESIA PREPROCEDURE EVALUATION
Anesthesia Evaluation      Patient summary reviewed   History of anesthetic complications (Terrible back pain after a spinal)     Airway   Mallampati: II   Pulmonary - negative ROS and normal exam                          Cardiovascular - normal exam  Exercise tolerance: > or = 4 METS  (+) hypertension well controlled, ,     ECG reviewed (NSR)  Rhythm: regular  Rate: normal,         Neuro/Psych - negative ROS     Endo/Other    (+) obesity,      GI/Hepatic/Renal - negative ROS      Other findings: K=4.5  Cr=0.98  Hgb=15.7  Ixs=152\GFR>60      Dental - normal exam                        Anesthesia Plan  Planned anesthetic: general endotracheal  Selective tibial/saphenous nerve block for pop  GAETT  Soft bite block  antiemetics  ASA 2   Induction: intravenous   Anesthetic plan and risks discussed with: patient and spouse  Anesthesia plan special considerations: antiemetics,   Post-op plan: routine recovery

## 2021-06-21 NOTE — ANESTHESIA PROCEDURE NOTES
Peripheral Block    Patient location during procedure: pre-op  Start time: 11/26/2018 10:40 AM  End time: 11/26/2018 10:41 AM  post-op analgesia per surgeon order as noted in medical record  Staffing:  Performing  Anesthesiologist: Endy Lopez MD  Preanesthetic Checklist  Completed: patient identified, site marked, risks, benefits, and alternatives discussed, timeout performed, consent obtained, airway assessed, oxygen available, suction available, emergency drugs available and hand hygiene performed  Peripheral Block  Block type: saphenous, adductor canal block  Prep: ChloraPrep  Patient position: supine  Patient monitoring: cardiac monitor, continuous pulse oximetry, heart rate and blood pressure  Laterality: right  Injection technique: ultrasound guided            Needle  Needle type: Stimuplex   Needle gauge: 20G  Needle length: 4 in  no peripheral nerve catheter placed  Assessment  Injection assessment: no difficulty with injection, negative aspiration for heme, no paresthesia on injection and incremental injection

## 2021-08-17 ENCOUNTER — HOSPITAL ENCOUNTER (EMERGENCY)
Facility: CLINIC | Age: 71
Discharge: HOME | End: 2021-08-17
Attending: PHYSICIAN ASSISTANT | Admitting: PHYSICIAN ASSISTANT
Payer: COMMERCIAL

## 2021-08-17 ENCOUNTER — APPOINTMENT (OUTPATIENT)
Dept: GENERAL RADIOLOGY | Facility: CLINIC | Age: 71
End: 2021-08-17
Attending: PHYSICIAN ASSISTANT
Payer: COMMERCIAL

## 2021-08-17 VITALS
SYSTOLIC BLOOD PRESSURE: 156 MMHG | WEIGHT: 225 LBS | OXYGEN SATURATION: 97 % | HEART RATE: 76 BPM | DIASTOLIC BLOOD PRESSURE: 81 MMHG | TEMPERATURE: 97.7 F | RESPIRATION RATE: 20 BRPM | HEIGHT: 73 IN | BODY MASS INDEX: 29.82 KG/M2

## 2021-08-17 DIAGNOSIS — S61.419A HAND LACERATION: ICD-10-CM

## 2021-08-17 PROCEDURE — 99213 OFFICE O/P EST LOW 20 MIN: CPT | Mod: 25 | Performed by: PHYSICIAN ASSISTANT

## 2021-08-17 PROCEDURE — 90471 IMMUNIZATION ADMIN: CPT | Performed by: PHYSICIAN ASSISTANT

## 2021-08-17 PROCEDURE — 250N000011 HC RX IP 250 OP 636: Performed by: PHYSICIAN ASSISTANT

## 2021-08-17 PROCEDURE — 73130 X-RAY EXAM OF HAND: CPT | Mod: LT

## 2021-08-17 PROCEDURE — 90715 TDAP VACCINE 7 YRS/> IM: CPT | Performed by: PHYSICIAN ASSISTANT

## 2021-08-17 PROCEDURE — G0463 HOSPITAL OUTPT CLINIC VISIT: HCPCS | Mod: 25 | Performed by: PHYSICIAN ASSISTANT

## 2021-08-17 PROCEDURE — 12001 RPR S/N/AX/GEN/TRNK 2.5CM/<: CPT | Performed by: PHYSICIAN ASSISTANT

## 2021-08-17 RX ADMIN — CLOSTRIDIUM TETANI TOXOID ANTIGEN (FORMALDEHYDE INACTIVATED), CORYNEBACTERIUM DIPHTHERIAE TOXOID ANTIGEN (FORMALDEHYDE INACTIVATED), BORDETELLA PERTUSSIS TOXOID ANTIGEN (GLUTARALDEHYDE INACTIVATED), BORDETELLA PERTUSSIS FILAMENTOUS HEMAGGLUTININ ANTIGEN (FORMALDEHYDE INACTIVATED), BORDETELLA PERTUSSIS PERTACTIN ANTIGEN, AND BORDETELLA PERTUSSIS FIMBRIAE 2/3 ANTIGEN 0.5 ML: 5; 2; 2.5; 5; 3; 5 INJECTION, SUSPENSION INTRAMUSCULAR at 14:10

## 2021-08-17 ASSESSMENT — ENCOUNTER SYMPTOMS
CARDIOVASCULAR NEGATIVE: 1
WOUND: 1
RESPIRATORY NEGATIVE: 1
MUSCULOSKELETAL NEGATIVE: 1
GASTROINTESTINAL NEGATIVE: 1

## 2021-08-17 ASSESSMENT — MIFFLIN-ST. JEOR: SCORE: 1839.47

## 2021-08-17 NOTE — ED PROVIDER NOTES
History     Chief Complaint   Patient presents with     Laceration     left thumb. needs tetanus update     HPI  Taj Velazquez Sr. is a 71 year old male with a history of hypertension who presents with a laceration/puncture wound on his left hand which occurred about 1130 this morning.  The patient was slicing shrink wrap off some lawnmower blades with a pocket knife and accidentally slipped, causing the knife blade to plunge into the hypothenar eminence of his left hand.  The patient experienced a lot of bleeding including pulsating bleeding from the wound at first, well controlled after applying pressure.  States that he rinsed the wound copious amounts of fluid after the injury occurred.  Currently rates the pain as 5/10, pain does not radiate.  He has not taken any medications or applied ice to the affected area.  He does describe having some localized swelling in the affected area to the point where it is hard to move his thumb toward his index finger.  No concerns with sensation in the left hand.  No previous surgeries or injuries to the left hand.  Last Tdap vaccine was in 2011.     Allergies:  Allergies   Allergen Reactions     Lisinopril Cough     Nkda [No Known Drug Allergies]        Problem List:    Patient Active Problem List    Diagnosis Date Noted     Anemia due to blood loss, acute 11/27/2018     Priority: Medium     Retention of urine 11/27/2018     Priority: Medium     BMI 34.0-34.9,adult 11/26/2018     Priority: Medium     History of tobacco use disorder 11/26/2018     Priority: Medium     Benign essential hypertension 01/05/2010     Priority: Medium     Pre-diabetes 01/05/2010     Priority: Medium        Past Medical History:    Past Medical History:   Diagnosis Date     Anemia due to blood loss, acute 11/27/2018     Arthritis      Asthma      BMI 34.0-34.9,adult 11/26/2018     Cough secondary to angiotensin converting enzyme inhibitor (ACE-I)      History of anesthesia complications       History of tobacco use disorder 2018     Hypertension      Nasal congestion 2018     Partial tear of right subscapularis tendon      Pre-diabetes      Rupture of long head biceps tendon, right, initial encounter      Tear of right infraspinatus tendon      Tear of right supraspinatus tendon      Type 2 diabetes mellitus with hyperglycemia, without long-term current use of insulin (H)      Urinary retention 2018       Past Surgical History:    Past Surgical History:   Procedure Laterality Date     C TOTAL KNEE ARTHROPLASTY Left 2017    Procedure: LEFT TOTAL KNEE ARTHROPLASTY;  Surgeon: Endy Baker MD;  Location: [facility] OR;  Service: Orthopedics     C TOTAL KNEE ARTHROPLASTY Right 2018    Procedure: RIGHT TOTAL KNEE ARTHROPLASTY;  Surgeon: Endy Baker MD;  Location: [facility] OR;  Service: Orthopedics     COLONOSCOPY  2011    Procedure:COLONOSCOPY; Colonoscopy; Surgeon:WGYN MELVIN; Location:[facility]     JOINT REPLACEMENT Left       knee     RELEASE CARPAL TUNNEL       SPINAL FUSION         Family History:    Family History   Problem Relation Age of Onset     Cancer Mother      Diabetes Father      Hypertension Father      Hypertension Paternal Uncle      Heart Disease Paternal Uncle        Social History:  Marital Status:   [2]  Social History     Tobacco Use     Smoking status: Former Smoker     Packs/day: 1.00     Years: 15.00     Pack years: 15.00     Quit date: 1978     Years since quittin.3     Smokeless tobacco: Never Used   Substance Use Topics     Alcohol use: Yes     Alcohol/week: 28.0 standard drinks     Comment: Alcoholic Drinks/day: 3 drinks      Drug use: No        Medications:    losartan (COZAAR) 25 MG tablet          Review of Systems   Respiratory: Negative.    Cardiovascular: Negative.    Gastrointestinal: Negative.    Musculoskeletal: Negative.    Skin: Positive for wound.       Physical Exam   BP: (!)  "156/81  Pulse: 76  Temp: 97.7  F (36.5  C)  Resp: 20  Height: 185.4 cm (6' 1\")  Weight: 102.1 kg (225 lb)  SpO2: 97 %      Physical Exam  Constitutional:       General: He is not in acute distress.     Appearance: Normal appearance. He is not ill-appearing, toxic-appearing or diaphoretic.   HENT:      Head: Normocephalic and atraumatic.   Cardiovascular:      Pulses: Normal pulses.           Radial pulses are 2+ on the right side and 2+ on the left side.   Skin:     General: Skin is warm and dry.      Findings: Laceration present. No rash.             Comments: Laceration 1 cm in length, 5 mm in depth located in hypothenar eminence of left hand, dorsal side.  He has some localized ecchymosis and swelling around the wound.  Has limited range of motion in active flexion/opposition of the thumb due to edema in the hypothenar eminence.  Has mildly reduced  strength but normal sensation in the left hand.   Neurological:      General: No focal deficit present.      Mental Status: He is alert and oriented to person, place, and time.      Sensory: No sensory deficit.      Motor: No weakness.      Coordination: Coordination normal.      Gait: Gait normal.   Psychiatric:         Mood and Affect: Mood normal.         Behavior: Behavior normal.         Thought Content: Thought content normal.         Judgment: Judgment normal.         ED [facility]    -Laceration Repair    Date/Time: 8/17/2021 9:44 PM  Performed by: Ole Toure PA-C  Authorized by: Ole Toure PA-C       ANESTHESIA (see MAR for exact dosages):     Anesthesia method:  Local infiltration    Local anesthetic:  Lidocaine 1% w/o epi  LACERATION DETAILS     Location:  Hand    Hand location:  L hand, dorsum    Length (cm):  1    Depth (mm):  5    REPAIR TYPE:     Repair type:  Simple      EXPLORATION:     Wound exploration: wound explored through full range of motion and entire depth of wound probed " and visualized      Wound extent: no foreign body and no tendon damage      Contaminated: no      TREATMENT:     Area cleansed with:  Betadine    Amount of cleaning:  Standard    Irrigation solution:  Sterile saline and tap water    Irrigation method:  Pressure wash and syringe    Visualized foreign bodies/material removed: no      SKIN REPAIR     Repair method:  Sutures    Suture size:  4-0    Suture material:  Nylon    Suture technique:  Simple interrupted    Number of sutures:  1    APPROXIMATION     Approximation:  Close    POST-PROCEDURE DETAILS     Dressing:  Antibiotic ointment and non-adherent dressing      PROCEDURE   Patient Tolerance:  Patient tolerated the procedure well with no immediate complications                    Results for orders placed or performed during the hospital encounter of 08/17/21 (from the past 24 hour(s))   XR Hand Left G/E 3 Views    Narrative    HAND LEFT THREE OR MORE VIEWS   8/17/2021 2:24 PM     HISTORY:  Puncture wound in hypothenar eminence.      Impression    IMPRESSION: There is a cluster of small calcifications or foreign  bodies within the dorsal-radial soft tissues of the index finger at  the level of the distal phalanx. Otherwise unremarkable. No fracture  identified.     KIM FARIAS MD         SYSTEM ID:  [id number]       Medications   Tdap (tetanus-diphtheria-acell pertussis) (ADACEL) injection 0.5 mL (0.5 mLs Intramuscular Given 8/17/21 1410)       Assessments & Plan (with Medical Decision Making)   The patient is a 71 year old male with a history of hypertension who presents with a laceration/puncture wound on his left hand which occurred about 1130 this morning.  The patient was using his pocket knife and accidentally jammed the knife into the hypothenar eminence of his left hand.  Tdap vaccine was updated while in clinic today.  Has mildly reduced  strength but normal sensation in his left hand.     X-rays of the left hand showed no evidence of foreign body at the  site of the injury.    Laceration repair was performed as above.  Apply ice for 15-minute intervals over the next 48 hours and use over-the-counter Tylenol/ibuprofen for pain control as needed.  Recommended keeping the wound covered for the next 48 hours.  Then allow exposure to air at nighttime, change dressings and apply bacitracin once daily for the next 7 days. Recommend keeping the wound covered while while there is potential for contamination.  Recommend returning to clinic in 7 to 10 days for suture removal and wound recheck.    The patient has moderate edema and ecchymosis around the wound site which is preventing him from completely abducting and moving his thumb and opposition.  Marilyn with the patient that I feel this will improve with time after application of ice for 2 days.    Recommend urgent medical evaluation if the patient develops worsening pain, pain out of proportion to injury, numbness or tingling or loss of feeling, or redness/tenderness/swelling in the left hand or red streaks progressing up the left hand or left forearm.    I have reviewed the nursing notes.    I have reviewed the findings, diagnosis, plan and need for follow up with the patient.    Discharge Medication List as of 8/17/2021  2:55 PM          Final diagnoses:   Hand laceration       8/17/2021   [facility] EMERGENCY DEPT     Ole Toure PA-C  08/17/21 [id number]

## 2021-08-17 NOTE — DISCHARGE INSTRUCTIONS
Apply ice for 15-minute intervals over the next 48 hours and use over-the-counter Tylenol for pain control as needed.  Change dressings and apply bacitracin once or twice daily for the next 7 days. Recommend keeping the wound covered while while there is potential for contamination.  Recommend returning to clinic in 7 to 10 days for suture removal and wound recheck.

## 2021-11-10 ENCOUNTER — HOSPITAL ENCOUNTER (EMERGENCY)
Facility: CLINIC | Age: 71
Discharge: HOME | End: 2021-11-10
Attending: NURSE PRACTITIONER | Admitting: NURSE PRACTITIONER
Payer: COMMERCIAL

## 2021-11-10 VITALS
OXYGEN SATURATION: 96 % | TEMPERATURE: 97.5 F | RESPIRATION RATE: 16 BRPM | DIASTOLIC BLOOD PRESSURE: 90 MMHG | HEART RATE: 74 BPM | SYSTOLIC BLOOD PRESSURE: 201 MMHG
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
## 2021-11-10 DIAGNOSIS — S30.861A TICK BITE OF ABDOMEN: ICD-10-CM

## 2021-11-10 PROCEDURE — 99282 EMERGENCY DEPT VISIT SF MDM: CPT | Performed by: NURSE PRACTITIONER

## 2021-11-10 PROCEDURE — 99282 EMERGENCY DEPT VISIT SF MDM: CPT

## 2021-11-10 RX ORDER — DOXYCYCLINE 100 MG/1
200 CAPSULE ORAL ONCE
Qty: 2 CAPSULE | Refills: 0 | Status: SHIPPED | OUTPATIENT
Start: 2021-11-10 | End: 2021-11-10

## 2021-11-10 RX ORDER — MUPIROCIN 20 MG/G
OINTMENT TOPICAL 3 TIMES DAILY
Qty: 30 G | Refills: 0 | Status: SHIPPED | OUTPATIENT
Start: 2021-11-10 | End: 2024-01-28

## 2021-11-10 ASSESSMENT — ENCOUNTER SYMPTOMS
WOUND: 1
COLOR CHANGE: 1

## 2021-11-10 NOTE — ED TRIAGE NOTES
"Pt had deer tick in skin approx 1 week ago.  Pt wife removed it but believes the head is still in there.  Pt states that he had a \"red ring\" rash around it.   "

## 2021-11-10 NOTE — ED PROVIDER NOTES
History     Chief Complaint   Patient presents with     Tick Removal     HPI  Taj Velazquez Sr. is a 71 year old male who presents to the emergency department for tick removal. Patient believes tick was adhered for ~4 days. Tried removing at home but unable to fully remove. Surrounding erythema. No fever, headache, fatigue, joint pain.     Allergies:  Allergies   Allergen Reactions     Lisinopril Cough     Nkda [No Known Drug Allergies]        Problem List:    Patient Active Problem List    Diagnosis Date Noted     Anemia due to blood loss, acute 11/27/2018     Priority: Medium     Retention of urine 11/27/2018     Priority: Medium     BMI 34.0-34.9,adult 11/26/2018     Priority: Medium     History of tobacco use disorder 11/26/2018     Priority: Medium     Benign essential hypertension 01/05/2010     Priority: Medium     Pre-diabetes 01/05/2010     Priority: Medium        Past Medical History:    Past Medical History:   Diagnosis Date     Anemia due to blood loss, acute 11/27/2018     Arthritis      Asthma      BMI 34.0-34.9,adult 11/26/2018     Cough secondary to angiotensin converting enzyme inhibitor (ACE-I)      History of anesthesia complications      History of tobacco use disorder 11/26/2018     Hypertension      Nasal congestion 11/26/2018     Partial tear of right subscapularis tendon      Pre-diabetes      Rupture of long head biceps tendon, right, initial encounter      Tear of right infraspinatus tendon      Tear of right supraspinatus tendon      Type 2 diabetes mellitus with hyperglycemia, without long-term current use of insulin (H)      Urinary retention 11/27/2018       Past Surgical History:    Past Surgical History:   Procedure Laterality Date     C TOTAL KNEE ARTHROPLASTY Left 12/13/2017    Procedure: LEFT TOTAL KNEE ARTHROPLASTY;  Surgeon: Endy Baker MD;  Location: [facility];  Service: Orthopedics     C TOTAL KNEE ARTHROPLASTY Right 11/26/2018    Procedure: RIGHT TOTAL KNEE  ARTHROPLASTY;  Surgeon: Endy Baker MD;  Location: [facility] OR;  Service: Orthopedics     COLONOSCOPY  2011    Procedure:COLONOSCOPY; Colonoscopy; Surgeon:GWYN MELVIN; Location:[facility]     JOINT REPLACEMENT Left       knee     RELEASE CARPAL TUNNEL       SPINAL FUSION         Family History:    Family History   Problem Relation Age of Onset     Cancer Mother      Diabetes Father      Hypertension Father      Hypertension Paternal Uncle      Heart Disease Paternal Uncle        Social History:  Marital Status:   [2]  Social History     Tobacco Use     Smoking status: Former Smoker     Packs/day: 1.00     Years: 15.00     Pack years: 15.00     Quit date: 1978     Years since quittin.6     Smokeless tobacco: Never Used   Substance Use Topics     Alcohol use: Yes     Alcohol/week: 28.0 standard drinks     Comment: Alcoholic Drinks/day: 3 drinks      Drug use: No        Medications:    doxycycline hyclate (VIBRAMYCIN) 100 MG capsule  mupirocin (BACTROBAN) 2 % external ointment  losartan (COZAAR) 25 MG tablet          Review of Systems   Skin: Positive for color change and wound.   All other systems reviewed and are negative.      Physical Exam   BP: (!) 201/90  Pulse: 74  Temp: 97.5  F (36.4  C)  Resp: 16  SpO2: 96 %      Physical Exam  Constitutional:       General: He is not in acute distress.     Appearance: Normal appearance.   Cardiovascular:      Rate and Rhythm: Normal rate.   Pulmonary:      Effort: Pulmonary effort is normal.   Musculoskeletal:         General: Normal range of motion.   Skin:     General: Skin is warm.      Capillary Refill: Capillary refill takes less than 2 seconds.      Comments: Very small piece of tick remaining, removed fully. Slight surround erythema inconsistent with erythema migrans   Neurological:      General: No focal deficit present.      Mental Status: He is alert.         ED Course        Procedures    No results found for this or any  previous visit (from the past 24 hour(s)).    Medications - No data to display    Assessments & Plan (with Medical Decision Making)   71-year-old male to the emergency department for tick removal.  Remaining tick removed.  Area with surrounding erythema consistent with a localized reaction.  Patient would like prophylactic dose of doxycycline.  Mupirocin provided educated on home wound care.  Hypertensive during visit which patient reports is his baseline when he is at the hospital will continue to monitor at home. Return precautions reviewed, all questions answered. Patient is agreeable to plan of care and discharged in no acute distress.     I have reviewed the nursing notes.    I have reviewed the findings, diagnosis, plan and need for follow up with the patient.    Discharge Medication List as of 11/10/2021 10:22 AM      START taking these medications    Details   doxycycline hyclate (VIBRAMYCIN) 100 MG capsule Take 2 capsules (200 mg) by mouth once for 1 dose, Disp-2 capsule, R-0, E-Prescribe      mupirocin (BACTROBAN) 2 % external ointment Apply topically 3 times dailyDisp-30 g, [id number]             Final diagnoses:   Tick bite of abdomen       11/10/2021   [facility] EMERGENCY DEPT     Trina Thompson, APRN CNP  11/10/21 3593

## 2024-01-11 ENCOUNTER — APPOINTMENT (OUTPATIENT)
Dept: CT IMAGING | Facility: CLINIC | Age: 74
End: 2024-01-11
Attending: EMERGENCY MEDICINE
Payer: COMMERCIAL

## 2024-01-11 ENCOUNTER — HOSPITAL ENCOUNTER (EMERGENCY)
Facility: CLINIC | Age: 74
Discharge: HOME | End: 2024-01-11
Attending: EMERGENCY MEDICINE | Admitting: EMERGENCY MEDICINE
Payer: COMMERCIAL

## 2024-01-11 ENCOUNTER — APPOINTMENT (OUTPATIENT)
Dept: GENERAL RADIOLOGY | Facility: CLINIC | Age: 74
End: 2024-01-11
Attending: EMERGENCY MEDICINE
Payer: COMMERCIAL

## 2024-01-11 VITALS
HEART RATE: 92 BPM | DIASTOLIC BLOOD PRESSURE: 77 MMHG | OXYGEN SATURATION: 93 % | TEMPERATURE: 99.2 F | SYSTOLIC BLOOD PRESSURE: 150 MMHG | WEIGHT: 235 LBS | RESPIRATION RATE: 18 BRPM | BODY MASS INDEX: 31.14 KG/M2 | HEIGHT: 73 IN

## 2024-01-11 DIAGNOSIS — R93.89 ABNORMAL CT SCAN: ICD-10-CM

## 2024-01-11 DIAGNOSIS — N39.0 ACUTE UTI: ICD-10-CM

## 2024-01-11 LAB
ALBUMIN SERPL BCG-MCNC: 3.8 G/DL (ref 3.5–5.2)
ALBUMIN UR-MCNC: 100 MG/DL
ALP SERPL-CCNC: 71 U/L (ref 40–150)
ALT SERPL W P-5'-P-CCNC: 43 U/L (ref 0–70)
AMORPH CRY #/AREA URNS HPF: ABNORMAL /HPF
ANION GAP SERPL CALCULATED.3IONS-SCNC: 13 MMOL/L (ref 7–15)
APPEARANCE UR: ABNORMAL
AST SERPL W P-5'-P-CCNC: 26 U/L (ref 0–45)
BACTERIA #/AREA URNS HPF: ABNORMAL /HPF
BASOPHILS # BLD AUTO: 0 10E3/UL (ref 0–0.2)
BASOPHILS NFR BLD AUTO: 0 %
BILIRUB SERPL-MCNC: 1.7 MG/DL
BILIRUB UR QL STRIP: NEGATIVE
BUN SERPL-MCNC: 20.3 MG/DL (ref 8–23)
CALCIUM SERPL-MCNC: 8.9 MG/DL (ref 8.8–10.2)
CHLORIDE SERPL-SCNC: 95 MMOL/L (ref 98–107)
COLOR UR AUTO: ABNORMAL
CREAT SERPL-MCNC: 1 MG/DL (ref 0.67–1.17)
CRP SERPL-MCNC: 219.4 MG/L
DEPRECATED HCO3 PLAS-SCNC: 22 MMOL/L (ref 22–29)
EGFRCR SERPLBLD CKD-EPI 2021: 80 ML/MIN/1.73M2
EOSINOPHIL # BLD AUTO: 0 10E3/UL (ref 0–0.7)
EOSINOPHIL NFR BLD AUTO: 0 %
ERYTHROCYTE [DISTWIDTH] IN BLOOD BY AUTOMATED COUNT: 13 % (ref 10–15)
GLUCOSE BLDC GLUCOMTR-MCNC: 319 MG/DL (ref 70–99)
GLUCOSE SERPL-MCNC: 331 MG/DL (ref 70–99)
GLUCOSE UR STRIP-MCNC: >499 MG/DL
HCT VFR BLD AUTO: 38 % (ref 40–53)
HGB BLD-MCNC: 13.7 G/DL (ref 13.3–17.7)
HGB UR QL STRIP: ABNORMAL
IMM GRANULOCYTES # BLD: 0.1 10E3/UL
IMM GRANULOCYTES NFR BLD: 1 %
KETONES UR STRIP-MCNC: 5 MG/DL
LEUKOCYTE ESTERASE UR QL STRIP: ABNORMAL
LYMPHOCYTES # BLD AUTO: 1 10E3/UL (ref 0.8–5.3)
LYMPHOCYTES NFR BLD AUTO: 9 %
MCH RBC QN AUTO: 30.3 PG (ref 26.5–33)
MCHC RBC AUTO-ENTMCNC: 36.1 G/DL (ref 31.5–36.5)
MCV RBC AUTO: 84 FL (ref 78–100)
MONOCYTES # BLD AUTO: 0.9 10E3/UL (ref 0–1.3)
MONOCYTES NFR BLD AUTO: 8 %
MUCOUS THREADS #/AREA URNS LPF: PRESENT /LPF
NEUTROPHILS # BLD AUTO: 8.9 10E3/UL (ref 1.6–8.3)
NEUTROPHILS NFR BLD AUTO: 82 %
NITRATE UR QL: NEGATIVE
NRBC # BLD AUTO: 0 10E3/UL
NRBC BLD AUTO-RTO: 0 /100
PH UR STRIP: 5 [PH] (ref 5–7)
PLATELET # BLD AUTO: 109 10E3/UL (ref 150–450)
POTASSIUM SERPL-SCNC: 4.1 MMOL/L (ref 3.4–5.3)
PROT SERPL-MCNC: 7.1 G/DL (ref 6.4–8.3)
RBC # BLD AUTO: 4.52 10E6/UL (ref 4.4–5.9)
RBC URINE: 21 /HPF
SODIUM SERPL-SCNC: 130 MMOL/L (ref 135–145)
SP GR UR STRIP: 1.02 (ref 1–1.03)
SQUAMOUS EPITHELIAL: 2 /HPF
UROBILINOGEN UR STRIP-MCNC: NORMAL MG/DL
WBC # BLD AUTO: 10.9 10E3/UL (ref 4–11)
WBC CLUMPS #/AREA URNS HPF: PRESENT /HPF
WBC URINE: 180 /HPF

## 2024-01-11 PROCEDURE — 99285 EMERGENCY DEPT VISIT HI MDM: CPT | Mod: 25

## 2024-01-11 PROCEDURE — 87086 URINE CULTURE/COLONY COUNT: CPT | Performed by: EMERGENCY MEDICINE

## 2024-01-11 PROCEDURE — 82962 GLUCOSE BLOOD TEST: CPT

## 2024-01-11 PROCEDURE — 96365 THER/PROPH/DIAG IV INF INIT: CPT

## 2024-01-11 PROCEDURE — 71046 X-RAY EXAM CHEST 2 VIEWS: CPT

## 2024-01-11 PROCEDURE — 80053 COMPREHEN METABOLIC PANEL: CPT | Performed by: EMERGENCY MEDICINE

## 2024-01-11 PROCEDURE — 74176 CT ABD & PELVIS W/O CONTRAST: CPT

## 2024-01-11 PROCEDURE — 85025 COMPLETE CBC W/AUTO DIFF WBC: CPT | Performed by: EMERGENCY MEDICINE

## 2024-01-11 PROCEDURE — 258N000003 HC RX IP 258 OP 636: Performed by: EMERGENCY MEDICINE

## 2024-01-11 PROCEDURE — 81003 URINALYSIS AUTO W/O SCOPE: CPT | Performed by: EMERGENCY MEDICINE

## 2024-01-11 PROCEDURE — 99284 EMERGENCY DEPT VISIT MOD MDM: CPT | Performed by: EMERGENCY MEDICINE

## 2024-01-11 PROCEDURE — 70450 CT HEAD/BRAIN W/O DYE: CPT

## 2024-01-11 PROCEDURE — 86140 C-REACTIVE PROTEIN: CPT | Performed by: EMERGENCY MEDICINE

## 2024-01-11 PROCEDURE — 250N000011 HC RX IP 250 OP 636: Performed by: EMERGENCY MEDICINE

## 2024-01-11 PROCEDURE — 36415 COLL VENOUS BLD VENIPUNCTURE: CPT | Performed by: EMERGENCY MEDICINE

## 2024-01-11 RX ORDER — CEFUROXIME AXETIL 500 MG/1
500 TABLET ORAL 2 TIMES DAILY
Qty: 14 TABLET | Refills: 0 | Status: SHIPPED | OUTPATIENT
Start: 2024-01-11 | End: 2024-01-18

## 2024-01-11 RX ORDER — TAMSULOSIN HYDROCHLORIDE 0.4 MG/1
0.4 CAPSULE ORAL DAILY
Qty: 10 CAPSULE | Refills: 0 | Status: SHIPPED | OUTPATIENT
Start: 2024-01-11 | End: 2024-01-21

## 2024-01-11 RX ORDER — CEFTRIAXONE 2 G/1
2 INJECTION, POWDER, FOR SOLUTION INTRAMUSCULAR; INTRAVENOUS EVERY 24 HOURS
Status: DISCONTINUED | OUTPATIENT
Start: 2024-01-11 | End: 2024-01-11 | Stop reason: HOSPADM

## 2024-01-11 RX ORDER — SODIUM CHLORIDE 9 MG/ML
1000 INJECTION, SOLUTION INTRAVENOUS CONTINUOUS
Status: DISCONTINUED | OUTPATIENT
Start: 2024-01-11 | End: 2024-01-11 | Stop reason: HOSPADM

## 2024-01-11 RX ADMIN — SODIUM CHLORIDE 1000 ML: 9 INJECTION, SOLUTION INTRAVENOUS at 18:11

## 2024-01-11 RX ADMIN — CEFTRIAXONE SODIUM 2 G: 2 INJECTION, POWDER, FOR SOLUTION INTRAMUSCULAR; INTRAVENOUS at 18:11

## 2024-01-11 ASSESSMENT — ENCOUNTER SYMPTOMS
FREQUENCY: 1
EYES NEGATIVE: 1
PSYCHIATRIC NEGATIVE: 1
ENDOCRINE NEGATIVE: 1
GASTROINTESTINAL NEGATIVE: 1
NEUROLOGICAL NEGATIVE: 1
ALLERGIC/IMMUNOLOGIC NEGATIVE: 1
FATIGUE: 1
MUSCULOSKELETAL NEGATIVE: 1
COUGH: 1
HEMATOLOGIC/LYMPHATIC NEGATIVE: 1
CARDIOVASCULAR NEGATIVE: 1

## 2024-01-11 ASSESSMENT — ACTIVITIES OF DAILY LIVING (ADL)
ADLS_ACUITY_SCORE: 33
ADLS_ACUITY_SCORE: 35
ADLS_ACUITY_SCORE: 35

## 2024-01-11 NOTE — ED PROVIDER NOTES
History     Chief Complaint   Patient presents with    Urinary Frequency     HPI  Taj Velazquez Sr. is a 73 year old male who presents with concern about urinary frequency with chills and fatigue over the last 5 days.    Patient's medical records for history of urinary retention, tobacco use disorder, hypertension and history of anemia due to blood loss.    Patient's prescribed medications were reviewed    On examination patient arrived by car with his wife reporting that over the last 5 days he has had urinary frequency with incontinence.  He also notes that he is felt weak and that he is also had trouble with his balance.  He reports he is typically managing his glucose with metformin and does not check his glucose routinely.  His wife reports he has been coughing.  Patient does report no rash no fall he reports his balance has been off.  Due to his constellation of symptoms he presents for further care.    Allergies:  Allergies   Allergen Reactions    Lisinopril Cough    Nkda [No Known Drug Allergy]        Problem List:    Patient Active Problem List    Diagnosis Date Noted    Anemia due to blood loss, acute 11/27/2018     Priority: Medium    Retention of urine 11/27/2018     Priority: Medium    BMI 34.0-34.9,adult 11/26/2018     Priority: Medium    History of tobacco use disorder 11/26/2018     Priority: Medium    Benign essential hypertension 01/05/2010     Priority: Medium    Pre-diabetes 01/05/2010     Priority: Medium        Past Medical History:    Past Medical History:   Diagnosis Date    Anemia due to blood loss, acute 11/27/2018    Arthritis     Asthma     BMI 34.0-34.9,adult 11/26/2018    Cough secondary to angiotensin converting enzyme inhibitor (ACE-I)     History of anesthesia complications     History of tobacco use disorder 11/26/2018    Hypertension     Nasal congestion 11/26/2018    Partial tear of right subscapularis tendon     Pre-diabetes     Rupture of long head biceps tendon, right,  initial encounter     Tear of right infraspinatus tendon     Tear of right supraspinatus tendon     Type 2 diabetes mellitus with hyperglycemia, without long-term current use of insulin (H)     Urinary retention 2018       Past Surgical History:    Past Surgical History:   Procedure Laterality Date    COLONOSCOPY  2011    Procedure:COLONOSCOPY; Colonoscopy; Surgeon:GWYN MELVIN; Location:[facility]    JOINT REPLACEMENT Left       knee    RELEASE CARPAL TUNNEL      SPINAL FUSION      [facility] TOTAL KNEE ARTHROPLASTY Left 2017    Procedure: LEFT TOTAL KNEE ARTHROPLASTY;  Surgeon: Endy Baker MD;  Location: [facility];  Service: Orthopedics    [facility] TOTAL KNEE ARTHROPLASTY Right 2018    Procedure: RIGHT TOTAL KNEE ARTHROPLASTY;  Surgeon: Endy Baker MD;  Location: [facility];  Service: Orthopedics       Family History:    Family History   Problem Relation Age of Onset    Cancer Mother     Diabetes Father     Hypertension Father     Hypertension Paternal Uncle     Heart Disease Paternal Uncle        Social History:  Marital Status:   [2]  Social History     Tobacco Use    Smoking status: Former     Packs/day: 1.00     Years: 15.00     Additional pack years: 0.00     Total pack years: 15.00     Types: Cigarettes     Quit date: 1978     Years since quittin.7    Smokeless tobacco: Never   Substance Use Topics    Alcohol use: Yes     Alcohol/week: 28.0 standard drinks of alcohol     Comment: Alcoholic Drinks/day: 3 drinks     Drug use: No        Medications:    cefuroxime (CEFTIN) 500 MG tablet  tamsulosin (FLOMAX) 0.4 MG capsule  losartan (COZAAR) 25 MG tablet  mupirocin (BACTROBAN) 2 % external ointment          Review of Systems   Constitutional:  Positive for fatigue.   HENT: Negative.     Eyes: Negative.    Respiratory:  Positive for cough.    Cardiovascular: Negative.    Gastrointestinal: Negative.    Endocrine: Negative.    Genitourinary:  Positive for  "frequency (and incontinence).   Musculoskeletal: Negative.    Skin: Negative.    Allergic/Immunologic: Negative.    Neurological: Negative.    Hematological: Negative.    Psychiatric/Behavioral: Negative.     All other systems reviewed and are negative.      Physical Exam   BP: (!) 147/77  Pulse: 105  Temp: 99.2  F (37.3  C)  Resp: 18  Height: 185.4 cm (6' 1\")  Weight: 106.6 kg (235 lb)  SpO2: 95 %      Physical Exam  Constitutional:       General: He is not in acute distress.     Appearance: Normal appearance. He is not ill-appearing, toxic-appearing or diaphoretic.   HENT:      Head: Normocephalic and atraumatic.      Nose: Nose normal.      Mouth/Throat:      Mouth: Mucous membranes are moist.   Eyes:      Extraocular Movements: Extraocular movements intact.      Pupils: Pupils are equal, round, and reactive to light.   Cardiovascular:      Rate and Rhythm: Normal rate and regular rhythm.      Pulses: Normal pulses.      Heart sounds: Normal heart sounds.   Pulmonary:      Effort: Pulmonary effort is normal. No respiratory distress.      Breath sounds: Normal breath sounds. No stridor. No wheezing, rhonchi or rales.   Chest:      Chest wall: No tenderness.   Musculoskeletal:         General: No swelling, tenderness, deformity or signs of injury.      Cervical back: Normal range of motion and neck supple.      Right lower leg: No edema.      Left lower leg: No edema.   Skin:     Capillary Refill: Capillary refill takes less than 2 seconds.      Coloration: Skin is not jaundiced or pale.      Findings: No bruising, erythema, lesion or rash.   Neurological:      General: No focal deficit present.      Mental Status: He is alert and oriented to person, place, and time.      Cranial Nerves: No cranial nerve deficit.      Sensory: No sensory deficit.      Motor: No weakness.      Coordination: Coordination normal.      Gait: Gait normal.      Deep Tendon Reflexes: Reflexes normal.   Psychiatric:         Mood and " "Affect: Mood normal.         Behavior: Behavior normal.         Thought Content: Thought content normal.         Judgment: Judgment normal.         ED Course                 Procedures              Critical Care time:  none               ED medications:  Medications   sodium chloride 0.9 % infusion (has no administration in time range)   cefTRIAXone (ROCEPHIN) 2 g vial to attach to  ml bag for ADULTS or NS 50 ml bag for PEDS (0 g Intravenous Stopped 1/11/24 1848)   sodium chloride 0.9% BOLUS 1,000 mL (0 mLs Intravenous Stopped 1/11/24 1852)       ED Vitals:  Vitals:    01/11/24 1217 01/11/24 1345 01/11/24 1400 01/11/24 1830   BP: (!) 147/77 (!) 147/83  (!) 150/77   Pulse: 105 97  92   Resp: 18      Temp: 99.2  F (37.3  C)      TempSrc: Tympanic      SpO2: 95%  91% 93%   Weight: 106.6 kg (235 lb)      Height: 1.854 m (6' 1\")        ED labs and imaging:  Results for orders placed or performed during the hospital encounter of 01/11/24   Chest XR,  PA & LAT     Status: None    Narrative    EXAM: XR CHEST 2 VIEWS  LOCATION: [facility]  DATE: 1/11/2024    INDICATION: Cough.  History of diabetes.  Eval for pneumonia versus other acute cardiopulmonary process  COMPARISON: None.      Impression    IMPRESSION:     Lungs are clear. No evidence of pneumonia. No pleural effusions or pneumothorax. Normal pulmonary vascularity. Nonenlarged cardiac silhouette.   Head CT w/o contrast     Status: None    Narrative    EXAM: CT HEAD W/O CONTRAST  LOCATION: [facility]  DATE: 1/11/2024    INDICATION: Gait instability, urinary frequency and incontinence x 5 days.  New thrombocytopenia.  Evaluate for acute intracranial process  COMPARISON: None.  TECHNIQUE: Routine CT Head without IV contrast. Multiplanar reformats. Dose reduction techniques were used.    FINDINGS:  INTRACRANIAL CONTENTS: No intracranial hemorrhage, extraaxial collection, or mass effect.  No CT evidence of acute " infarct. Normal parenchymal attenuation. Normal ventricles and sulci.     VISUALIZED ORBITS/SINUSES/MASTOIDS: No intraorbital abnormality. Mild mucosal thickening scattered about the paranasal sinuses. No middle ear or mastoid effusion.    BONES/SOFT TISSUES: No acute abnormality.      Impression    IMPRESSION:  1.  No acute intracranial process.   Abd/pelvis CT - no contrast - Stone Protocol     Status: None    Narrative    EXAM: CT ABDOMEN PELVIS W/O CONTRAST  LOCATION: [facility]  DATE: 1/11/2024    INDICATION: Urinary incontinence, hematuria, and UTI. Evaluate for obstructing stone vs other acute  process.  COMPARISON: None available.  TECHNIQUE: CT scan of the abdomen and pelvis was performed without IV contrast. Multiplanar reformats were obtained. Dose reduction techniques were used.  CONTRAST: None.    FINDINGS:   LOWER CHEST: Minimal bibasilar scarring/fibrosis. No airspace consolidation or pleural effusion.    HEPATOBILIARY: Diffuse hepatic steatosis. No evidence of biliary obstruction.    PANCREAS: Normal.    SPLEEN: Normal.    ADRENAL GLANDS: Normal.    KIDNEYS/BLADDER: Simple-appearing left renal cyst, no specific follow-up recommended. No nephroureterolithiasis or hydronephrosis. Urinary bladder demonstrates diffuse wall thickening with surrounding fat stranding.    BOWEL: No obstruction or inflammatory change. Normal appendix.    LYMPH NODES: No suspicious lymphadenopathy.    VASCULATURE: Scattered calcified atherosclerosis.    PELVIC ORGANS: Prostatomegaly.    MUSCULOSKELETAL: No acute bony abnormality.      Impression    IMPRESSION:   1.  Findings consistent with cystitis.  2.  No nephroureterolithiasis or hydronephrosis.  3.  Prostatomegaly.  4.  Hepatic steatosis.     Glucose by meter     Status: Abnormal   Result Value Ref Range    GLUCOSE BY METER POCT 319 (H) 70 - 99 mg/dL   UA with Microscopic reflex to Culture     Status: Abnormal    Specimen: Urine, Clean Catch    Result Value Ref Range    Color Urine Kellie (A) Colorless, Straw, Light Yellow, Yellow    Appearance Urine Cloudy (A) Clear    Glucose Urine >499 (A) Negative mg/dL    Bilirubin Urine Negative Negative    Ketones Urine 5 (A) Negative mg/dL    Specific Gravity Urine 1.024 1.003 - 1.035    Blood Urine Moderate (A) Negative    pH Urine 5.0 5.0 - 7.0    Protein Albumin Urine 100 (A) Negative mg/dL    Urobilinogen Urine Normal Normal, 2.0 mg/dL    Nitrite Urine Negative Negative    Leukocyte Esterase Urine Small (A) Negative    Bacteria Urine Moderate (A) None Seen /HPF    WBC Clumps Urine Present (A) None Seen /HPF    Mucus Urine Present (A) None Seen /LPF    Amorphous Crystals Urine Few (A) None Seen /HPF    RBC Urine 21 (H) <=2 /HPF    WBC Urine 180 (H) <=5 /HPF    Squamous Epithelials Urine 2 (H) <=1 /HPF    Narrative    Urine Culture ordered based on laboratory criteria   Comprehensive metabolic panel     Status: Abnormal   Result Value Ref Range    Sodium 130 (L) 135 - 145 mmol/L    Potassium 4.1 3.4 - 5.3 mmol/L    Carbon Dioxide (CO2) 22 22 - 29 mmol/L    Anion Gap 13 7 - 15 mmol/L    Urea Nitrogen 20.3 8.0 - 23.0 mg/dL    Creatinine 1.00 0.67 - 1.17 mg/dL    GFR Estimate 80 >60 mL/min/1.73m2    Calcium 8.9 8.8 - 10.2 mg/dL    Chloride 95 (L) 98 - 107 mmol/L    Glucose 331 (H) 70 - 99 mg/dL    Alkaline Phosphatase 71 40 - 150 U/L    AST 26 0 - 45 U/L    ALT 43 0 - 70 U/L    Protein Total 7.1 6.4 - 8.3 g/dL    Albumin 3.8 3.5 - 5.2 g/dL    Bilirubin Total 1.7 (H) <=1.2 mg/dL   CRP Inflammation     Status: Abnormal   Result Value Ref Range    CRP Inflammation 219.40 (H) <5.00 mg/L   CBC with platelets and differential     Status: Abnormal   Result Value Ref Range    WBC Count 10.9 4.0 - 11.0 10e3/uL    RBC Count 4.52 4.40 - 5.90 10e6/uL    Hemoglobin 13.7 13.3 - 17.7 g/dL    Hematocrit 38.0 (L) 40.0 - 53.0 %    MCV 84 78 - 100 fL    MCH 30.3 26.5 - 33.0 pg    MCHC 36.1 31.5 - 36.5 g/dL    RDW 13.0 10.0 - 15.0 %     Platelet Count 109 (L) 150 - 450 10e3/uL    % Neutrophils 82 %    % Lymphocytes 9 %    % Monocytes 8 %    % Eosinophils 0 %    % Basophils 0 %    % Immature Granulocytes 1 %    NRBCs per 100 WBC 0 <1 /100    Absolute Neutrophils 8.9 (H) 1.6 - 8.3 10e3/uL    Absolute Lymphocytes 1.0 0.8 - 5.3 10e3/uL    Absolute Monocytes 0.9 0.0 - 1.3 10e3/uL    Absolute Eosinophils 0.0 0.0 - 0.7 10e3/uL    Absolute Basophils 0.0 0.0 - 0.2 10e3/uL    Absolute Immature Granulocytes 0.1 <=0.4 10e3/uL    Absolute NRBCs 0.0 10e3/uL   CBC with platelets differential     Status: Abnormal    Narrative    The following orders were created for panel order CBC with platelets differential.  Procedure                               Abnormality         Status                     ---------                               -----------         ------                     CBC with platelets and d...[[id number]]  Abnormal            Final result                 Please view results for these tests on the individual orders.       Assessments & Plan (with Medical Decision Making)   Assessment Summary and Clinical Impression: 73-year-old male who presented with concern about chills fatigue and urinary frequency.  Patient on arrival was afebrile.  Blood pressure was 147/77, 95% on room air.  Patient was tachycardic-pulse was 105.  Patient reported no fall or trauma although he is felt that his gait has been off the last 5 days.  He also notes that he has had urinary incontinence.  No abdominal pain no flank pain or back pain no fever or rash.  His wife at the bedside reports he has had a cough.  He reports his glucose is managed primarily metformin.  Given his new symptoms last 5 days workup was undertaken to ensure he does not present with a diabetic emergency.   His workup revealed glucosuria and concern for pyuria with white blood cell clumps, and elevated CRP.  Head imaging revealed no acute process.  CT stone protocol-obtained to ensure there was no  obstructing stone.  CT revealed prostamegaly with hepatic steatosis but no obstructing stone or hydronephrosis.  He was discharged with cefuroxime for treatment for UTI and given Flomax for concern for BPH with plan for recheck in clinic within the next 7 to 10 days with low threshold to return for re-evaluation.    ED course and plan:  Reviewed the medical record.  We discussed and reviewed options for workup and care given his symptoms of the last 5 days.  Patient's glucose was 319 on arrival.  His A1c was 6.7-July 2023.  The patient reporting gait instability head imaging was obtained.  New thrombocytopenia platelet count 109.  Normal anion gap  Patient CRP on arrival was 219.  White count was 10.9.  Normal alk phos and liver enzymes.  Total bilirubin was 1.7.  Hyponatremia sodium 130    Urinalysis with leukocyturia, pyuria with white blood cell clumps, small leukocyte esterase.  Negative nitrites.  182 white cells per high-power field.  Patient was started on IV ceftriaxone.  CT stone protocol obtained to ensure there was no obstructing stone given urinary incontinence reported, hematuria and UTI on workup today.  CT revealed no evidence for nephroureterolithiasis or hydronephrosis. Prostatomegaly and hepatic steatosis and cystitis on CT.  See details outlined radiology report.    Reviewed CT findings and suspicion for UTI with the patient's giving prostamegaly and plan for outpatient treatment.  Urine culture pending.  Patient was discharged cefuroxime twice daily x 7 days on Flomax x 12 tablets with plan for clinic follow-up within the next 7 to 10 days.  Spouse and patient expressed comfort going home        Disclaimer: This note consists of symbols derived from keyboarding, dictation and/or voice recognition software. As a result, there may be errors in the script that have gone undetected. Please consider this when interpreting information found in this chart.   I have reviewed the nursing notes.    I have  reviewed the findings, diagnosis, plan and need for follow up with the patient.           Medical Decision Making  The patient's presentation was of moderate complexity (an acute illness with systemic symptoms).    The patient's evaluation involved:  ordering and/or review of 2 test(s) in this encounter (diagnostic imaging and lab)    The patient's management necessitated moderate risk (prescription drug management including medications given in the ED).        New Prescriptions    CEFUROXIME (CEFTIN) 500 MG TABLET    Take 1 tablet (500 mg) by mouth 2 times daily for 7 days    TAMSULOSIN (FLOMAX) 0.4 MG CAPSULE    Take 1 capsule (0.4 mg) by mouth daily for 10 doses       Final diagnoses:   Acute UTI   Abnormal CT scan - Cystitis Prostatomegaly. 4.  Hepatic steatosis.       1/11/2024   [facility] EMERGENCY DEPT       Bubba Azevedo MD  01/11/24 2148

## 2024-01-11 NOTE — ED TRIAGE NOTES
Urinary frequency since Sunday, chills and fatigue.     Triage Assessment (Adult)       Row Name 01/11/24 1216          Triage Assessment    Airway WDL WDL        Respiratory WDL    Respiratory WDL WDL

## 2024-01-12 NOTE — DISCHARGE INSTRUCTIONS
1) Your consideration that your episodes of urinary incontinence in the last 5 days is likely due to a bladder infection with enlarged prostate noted on CT imaging.  During your visit imaging not show any evidence of abnormality involving the brain and no evidence of an obstructing stone on CT.     2) For home discharged with antibiotics cefuroxime to take twice a day over the next 7 days pending urine culture results.  If there is any change urine culture results will be followed by the ED follow-up nurses.  You are also placed on Flomax for enlarged prostate.  See handout provided for, and severe side effects with taking the Flomax.    3) I recommend a follow-up visit with your clinic provider for recheck on symptoms within the next 1 week including a recheck  of your electrolytes given your sodium was on the lower end of normal.     4) Although he appears stable for discharge to home at this time if your symptoms worsen or other new concerns you should return to reevaluated

## 2024-01-13 LAB — BACTERIA UR CULT: ABNORMAL

## 2024-01-13 NOTE — RESULT ENCOUNTER NOTE
Final Urine Culture Report on 1/13/24  [facility] Emergency Dept discharge antibiotic prescribed: cefuroxime (Ceftin) 500 mg PO tablet, 1 tablet (500 mg) by mouth 2 times daily for 7 days  #1. Bacteria, >100,000 CFU/ML Escherichia coli is SUSCEPTIBLE to Antibiotic.    No change in treatment per [facility] ED lab result Urine Culture protocol.

## 2024-01-15 ENCOUNTER — TELEPHONE (OUTPATIENT)
Dept: EMERGENCY MEDICINE | Facility: CLINIC | Age: 74
End: 2024-01-15
Payer: COMMERCIAL

## 2024-01-15 NOTE — TELEPHONE ENCOUNTER
"[facility] Emergency Department/Urgent Care Lab result notification  [Note:  ED Lab Results RN will reference the [facility] Emergency Dept visit note prior to contacting patient AND/OR prior to consulting Emergency Dept Provider.  Highlights of Emergency Dept visit in information summary at the bottom of this telephone note]    1. Reason for call  Notify of lab results  Assess patient symptoms [if necessary]  Review ED Providers recommendations/discharge instructions (if necessary)  Advise per [facility] ED lab result protocol    2. Lab Result (including Rx patient on, if applicable).  If culture, copy of lab report at bottom.  Final Urine Culture Report on 1/13/24  [facility] Emergency Dept discharge antibiotic prescribed: cefuroxime (Ceftin) 500 mg PO tablet, 1 tablet (500 mg) by mouth 2 times daily for 7 days  #1. Bacteria, >100,000 CFU/ML Escherichia coli is SUSCEPTIBLE to Antibiotic.    No change in treatment per [facility] ED lab result Urine Culture protocol.    3. RN Assessment (Patient's current Symptoms):  Time of call: 1/15/2024 11:29 AM  Assessment: patient reports \"I'm feeling a lot better, all the symptoms I was fighting have went away..\", patient has an appetite now - is eating and drinking  Genitourinary symptoms:  No pain/burning, frequency, urgency  Fever:  None    4. RN Recommendations/Instructions per [place] ED lab result protocol  [facility] ED lab result protocol used: Urine culture  Patient was notified of lab result and treatment recommendations  RN reviewed information about continue and complete antibiotic, probiotic, UTI prevention, return for any worsening fevers, flank pain, nausea/vomiting.   Information on preventing future UTI's:  Drink plenty of fluids such as water, juice, or other caffeine-free drinks. This helps flush bacteria out of your system.  Empty your bladder when you feel the urge to urinate and before going to sleep. Urine that " stays in your bladder promotes infection.  Keep follow-up appointments with your health care provider. He or she can may do tests to make sure the infection has cleared. If necessary, additional treatment can be started.      5. Please Contact your PCP clinic or return to the Emergency department if your:  Symptoms return.  Symptoms do not improve after 3 days on antibiotic.  Symptoms do not resolve after completing antibiotic.  Symptoms worsen or other concerning symptoms.        Caleb Schreiber RN  [facility]  Emergency Dept Lab Result RN  Ph# [phone]

## 2024-01-28 ENCOUNTER — HOSPITAL ENCOUNTER (INPATIENT)
Facility: CLINIC | Age: 74
LOS: 1 days | Discharge: HOME | DRG: 872 | End: 2024-01-30
Attending: FAMILY MEDICINE | Admitting: STUDENT IN AN ORGANIZED HEALTH CARE EDUCATION/TRAINING PROGRAM
Payer: COMMERCIAL

## 2024-01-28 DIAGNOSIS — E11.9 TYPE 2 DIABETES MELLITUS WITHOUT COMPLICATION, WITHOUT LONG-TERM CURRENT USE OF INSULIN (H): Primary | ICD-10-CM

## 2024-01-28 DIAGNOSIS — N39.0 URINARY TRACT INFECTION WITHOUT HEMATURIA, SITE UNSPECIFIED: ICD-10-CM

## 2024-01-28 PROBLEM — A41.51 SEPSIS DUE TO ESCHERICHIA COLI WITHOUT ACUTE ORGAN DYSFUNCTION (H): Status: ACTIVE | Noted: 2024-01-28

## 2024-01-28 LAB
ALBUMIN SERPL BCG-MCNC: 4 G/DL (ref 3.5–5.2)
ALBUMIN UR-MCNC: 30 MG/DL
ALP SERPL-CCNC: 76 U/L (ref 40–150)
ALT SERPL W P-5'-P-CCNC: 37 U/L (ref 0–70)
ANION GAP SERPL CALCULATED.3IONS-SCNC: 14 MMOL/L (ref 7–15)
APPEARANCE UR: ABNORMAL
AST SERPL W P-5'-P-CCNC: 23 U/L (ref 0–45)
BACTERIA #/AREA URNS HPF: ABNORMAL /HPF
BASE EXCESS BLDV CALC-SCNC: 3 MMOL/L (ref -3–3)
BASOPHILS # BLD AUTO: 0 10E3/UL (ref 0–0.2)
BASOPHILS NFR BLD AUTO: 0 %
BILIRUB DIRECT SERPL-MCNC: 0.33 MG/DL (ref 0–0.3)
BILIRUB SERPL-MCNC: 1.1 MG/DL
BILIRUB UR QL STRIP: NEGATIVE
BUN SERPL-MCNC: 15.8 MG/DL (ref 8–23)
CALCIUM SERPL-MCNC: 9.5 MG/DL (ref 8.8–10.2)
CHLORIDE SERPL-SCNC: 98 MMOL/L (ref 98–107)
COLOR UR AUTO: YELLOW
CREAT SERPL-MCNC: 0.75 MG/DL (ref 0.67–1.17)
DEPRECATED HCO3 PLAS-SCNC: 20 MMOL/L (ref 22–29)
EGFRCR SERPLBLD CKD-EPI 2021: >90 ML/MIN/1.73M2
EOSINOPHIL # BLD AUTO: 0 10E3/UL (ref 0–0.7)
EOSINOPHIL NFR BLD AUTO: 0 %
ERYTHROCYTE [DISTWIDTH] IN BLOOD BY AUTOMATED COUNT: 13.2 % (ref 10–15)
FLUAV RNA SPEC QL NAA+PROBE: NEGATIVE
FLUBV RNA RESP QL NAA+PROBE: NEGATIVE
GLUCOSE BLDC GLUCOMTR-MCNC: 264 MG/DL (ref 70–99)
GLUCOSE BLDC GLUCOMTR-MCNC: 286 MG/DL (ref 70–99)
GLUCOSE BLDC GLUCOMTR-MCNC: 309 MG/DL (ref 70–99)
GLUCOSE SERPL-MCNC: 310 MG/DL (ref 70–99)
GLUCOSE UR STRIP-MCNC: >499 MG/DL
HBA1C MFR BLD: 10 %
HCO3 BLDV-SCNC: 27 MMOL/L (ref 21–28)
HCT VFR BLD AUTO: 39 % (ref 40–53)
HGB BLD-MCNC: 13.8 G/DL (ref 13.3–17.7)
HGB UR QL STRIP: ABNORMAL
HOLD SPECIMEN: NORMAL
IMM GRANULOCYTES # BLD: 0 10E3/UL
IMM GRANULOCYTES NFR BLD: 0 %
KETONES UR STRIP-MCNC: NEGATIVE MG/DL
LACTATE SERPL-SCNC: 1.9 MMOL/L (ref 0.7–2)
LEUKOCYTE ESTERASE UR QL STRIP: ABNORMAL
LYMPHOCYTES # BLD AUTO: 0.4 10E3/UL (ref 0.8–5.3)
LYMPHOCYTES NFR BLD AUTO: 4 %
MCH RBC QN AUTO: 29.5 PG (ref 26.5–33)
MCHC RBC AUTO-ENTMCNC: 35.4 G/DL (ref 31.5–36.5)
MCV RBC AUTO: 83 FL (ref 78–100)
MONOCYTES # BLD AUTO: 0.5 10E3/UL (ref 0–1.3)
MONOCYTES NFR BLD AUTO: 5 %
MUCOUS THREADS #/AREA URNS LPF: PRESENT /LPF
NEUTROPHILS # BLD AUTO: 9.1 10E3/UL (ref 1.6–8.3)
NEUTROPHILS NFR BLD AUTO: 91 %
NITRATE UR QL: POSITIVE
NRBC # BLD AUTO: 0 10E3/UL
NRBC BLD AUTO-RTO: 0 /100
O2/TOTAL GAS SETTING VFR VENT: 21 %
OXYHGB MFR BLDV: 73 % (ref 70–75)
PCO2 BLDV: 39 MM HG (ref 40–50)
PH BLDV: 7.45 [PH] (ref 7.32–7.43)
PH UR STRIP: 6 [PH] (ref 5–7)
PLATELET # BLD AUTO: 207 10E3/UL (ref 150–450)
PO2 BLDV: 38 MM HG (ref 25–47)
POTASSIUM SERPL-SCNC: 4.3 MMOL/L (ref 3.4–5.3)
PROT SERPL-MCNC: 7.4 G/DL (ref 6.4–8.3)
RBC # BLD AUTO: 4.68 10E6/UL (ref 4.4–5.9)
RBC URINE: 12 /HPF
RSV RNA SPEC NAA+PROBE: NEGATIVE
SAO2 % BLDV: 74.9 % (ref 70–75)
SARS-COV-2 RNA RESP QL NAA+PROBE: NEGATIVE
SODIUM SERPL-SCNC: 132 MMOL/L (ref 135–145)
SP GR UR STRIP: 1.02 (ref 1–1.03)
SQUAMOUS EPITHELIAL: <1 /HPF
UROBILINOGEN UR STRIP-MCNC: NORMAL MG/DL
WBC # BLD AUTO: 10.1 10E3/UL (ref 4–11)
WBC CLUMPS #/AREA URNS HPF: PRESENT /HPF
WBC URINE: >182 /HPF

## 2024-01-28 PROCEDURE — 99285 EMERGENCY DEPT VISIT HI MDM: CPT | Mod: 25 | Performed by: FAMILY MEDICINE

## 2024-01-28 PROCEDURE — 87149 DNA/RNA DIRECT PROBE: CPT | Performed by: EMERGENCY MEDICINE

## 2024-01-28 PROCEDURE — 87086 URINE CULTURE/COLONY COUNT: CPT | Performed by: EMERGENCY MEDICINE

## 2024-01-28 PROCEDURE — 250N000012 HC RX MED GY IP 250 OP 636 PS 637: Performed by: STUDENT IN AN ORGANIZED HEALTH CARE EDUCATION/TRAINING PROGRAM

## 2024-01-28 PROCEDURE — 250N000011 HC RX IP 250 OP 636: Performed by: FAMILY MEDICINE

## 2024-01-28 PROCEDURE — 83605 ASSAY OF LACTIC ACID: CPT | Performed by: FAMILY MEDICINE

## 2024-01-28 PROCEDURE — 250N000013 HC RX MED GY IP 250 OP 250 PS 637

## 2024-01-28 PROCEDURE — 87040 BLOOD CULTURE FOR BACTERIA: CPT | Performed by: FAMILY MEDICINE

## 2024-01-28 PROCEDURE — 96375 TX/PRO/DX INJ NEW DRUG ADDON: CPT

## 2024-01-28 PROCEDURE — 87186 SC STD MICRODIL/AGAR DIL: CPT | Performed by: EMERGENCY MEDICINE

## 2024-01-28 PROCEDURE — 99223 1ST HOSP IP/OBS HIGH 75: CPT | Mod: FS | Performed by: STUDENT IN AN ORGANIZED HEALTH CARE EDUCATION/TRAINING PROGRAM

## 2024-01-28 PROCEDURE — G0378 HOSPITAL OBSERVATION PER HR: HCPCS

## 2024-01-28 PROCEDURE — 258N000003 HC RX IP 258 OP 636: Performed by: STUDENT IN AN ORGANIZED HEALTH CARE EDUCATION/TRAINING PROGRAM

## 2024-01-28 PROCEDURE — 99207 PR APP CREDIT; MD BILLING SHARED VISIT: CPT

## 2024-01-28 PROCEDURE — 36415 COLL VENOUS BLD VENIPUNCTURE: CPT | Performed by: EMERGENCY MEDICINE

## 2024-01-28 PROCEDURE — 85025 COMPLETE CBC W/AUTO DIFF WBC: CPT | Performed by: FAMILY MEDICINE

## 2024-01-28 PROCEDURE — 258N000003 HC RX IP 258 OP 636: Performed by: FAMILY MEDICINE

## 2024-01-28 PROCEDURE — 87637 SARSCOV2&INF A&B&RSV AMP PRB: CPT | Performed by: FAMILY MEDICINE

## 2024-01-28 PROCEDURE — 96374 THER/PROPH/DIAG INJ IV PUSH: CPT | Performed by: FAMILY MEDICINE

## 2024-01-28 PROCEDURE — 96361 HYDRATE IV INFUSION ADD-ON: CPT | Performed by: FAMILY MEDICINE

## 2024-01-28 PROCEDURE — 82962 GLUCOSE BLOOD TEST: CPT

## 2024-01-28 PROCEDURE — 80053 COMPREHEN METABOLIC PANEL: CPT | Performed by: FAMILY MEDICINE

## 2024-01-28 PROCEDURE — 250N000013 HC RX MED GY IP 250 OP 250 PS 637: Performed by: FAMILY MEDICINE

## 2024-01-28 PROCEDURE — 80048 BASIC METABOLIC PNL TOTAL CA: CPT | Performed by: FAMILY MEDICINE

## 2024-01-28 PROCEDURE — 36415 COLL VENOUS BLD VENIPUNCTURE: CPT | Performed by: FAMILY MEDICINE

## 2024-01-28 PROCEDURE — 81001 URINALYSIS AUTO W/SCOPE: CPT | Performed by: EMERGENCY MEDICINE

## 2024-01-28 PROCEDURE — 93010 ELECTROCARDIOGRAM REPORT: CPT | Performed by: FAMILY MEDICINE

## 2024-01-28 PROCEDURE — 82805 BLOOD GASES W/O2 SATURATION: CPT | Performed by: FAMILY MEDICINE

## 2024-01-28 PROCEDURE — 83036 HEMOGLOBIN GLYCOSYLATED A1C: CPT | Performed by: STUDENT IN AN ORGANIZED HEALTH CARE EDUCATION/TRAINING PROGRAM

## 2024-01-28 PROCEDURE — 82248 BILIRUBIN DIRECT: CPT | Performed by: FAMILY MEDICINE

## 2024-01-28 PROCEDURE — 93005 ELECTROCARDIOGRAM TRACING: CPT | Performed by: FAMILY MEDICINE

## 2024-01-28 RX ORDER — LOSARTAN POTASSIUM 50 MG/1
50 TABLET ORAL DAILY
Status: DISCONTINUED | OUTPATIENT
Start: 2024-01-28 | End: 2024-01-30 | Stop reason: HOSPADM

## 2024-01-28 RX ORDER — SODIUM CHLORIDE, SODIUM LACTATE, POTASSIUM CHLORIDE, CALCIUM CHLORIDE 600; 310; 30; 20 MG/100ML; MG/100ML; MG/100ML; MG/100ML
INJECTION, SOLUTION INTRAVENOUS CONTINUOUS
Status: DISCONTINUED | OUTPATIENT
Start: 2024-01-28 | End: 2024-01-30 | Stop reason: HOSPADM

## 2024-01-28 RX ORDER — METFORMIN HCL 500 MG
1500 TABLET, EXTENDED RELEASE 24 HR ORAL
Status: ON HOLD | COMMUNITY
End: 2024-01-30

## 2024-01-28 RX ORDER — CEFTRIAXONE 2 G/1
2 INJECTION, POWDER, FOR SOLUTION INTRAMUSCULAR; INTRAVENOUS EVERY 24 HOURS
Status: DISCONTINUED | OUTPATIENT
Start: 2024-01-29 | End: 2024-01-29

## 2024-01-28 RX ORDER — FLUTICASONE PROPIONATE 50 MCG
1 SPRAY, SUSPENSION (ML) NASAL DAILY PRN
COMMUNITY
Start: 2022-12-20

## 2024-01-28 RX ORDER — AMOXICILLIN 250 MG
2 CAPSULE ORAL 2 TIMES DAILY PRN
Status: DISCONTINUED | OUTPATIENT
Start: 2024-01-28 | End: 2024-01-30 | Stop reason: HOSPADM

## 2024-01-28 RX ORDER — DEXTROSE MONOHYDRATE 25 G/50ML
25-50 INJECTION, SOLUTION INTRAVENOUS
Status: DISCONTINUED | OUTPATIENT
Start: 2024-01-28 | End: 2024-01-30 | Stop reason: HOSPADM

## 2024-01-28 RX ORDER — CEFTRIAXONE 2 G/1
2 INJECTION, POWDER, FOR SOLUTION INTRAMUSCULAR; INTRAVENOUS EVERY 24 HOURS
Status: CANCELLED | OUTPATIENT
Start: 2024-01-29

## 2024-01-28 RX ORDER — METFORMIN HYDROCHLORIDE 750 MG/1
1500 TABLET, EXTENDED RELEASE ORAL
Status: DISCONTINUED | OUTPATIENT
Start: 2024-01-29 | End: 2024-01-30 | Stop reason: HOSPADM

## 2024-01-28 RX ORDER — AMOXICILLIN 250 MG
1 CAPSULE ORAL 2 TIMES DAILY PRN
Status: DISCONTINUED | OUTPATIENT
Start: 2024-01-28 | End: 2024-01-30 | Stop reason: HOSPADM

## 2024-01-28 RX ORDER — ONDANSETRON 4 MG/1
4 TABLET, ORALLY DISINTEGRATING ORAL EVERY 6 HOURS PRN
Status: DISCONTINUED | OUTPATIENT
Start: 2024-01-28 | End: 2024-01-30 | Stop reason: HOSPADM

## 2024-01-28 RX ORDER — ATORVASTATIN CALCIUM 10 MG/1
10 TABLET, FILM COATED ORAL DAILY
COMMUNITY

## 2024-01-28 RX ORDER — ONDANSETRON 2 MG/ML
4 INJECTION INTRAMUSCULAR; INTRAVENOUS ONCE
Status: COMPLETED | OUTPATIENT
Start: 2024-01-28 | End: 2024-01-28

## 2024-01-28 RX ORDER — NICOTINE POLACRILEX 4 MG
15-30 LOZENGE BUCCAL
Status: DISCONTINUED | OUTPATIENT
Start: 2024-01-28 | End: 2024-01-30 | Stop reason: HOSPADM

## 2024-01-28 RX ORDER — CEFTRIAXONE 1 G/1
1 INJECTION, POWDER, FOR SOLUTION INTRAMUSCULAR; INTRAVENOUS EVERY 24 HOURS
Status: DISCONTINUED | OUTPATIENT
Start: 2024-01-28 | End: 2024-01-28

## 2024-01-28 RX ORDER — TAMSULOSIN HYDROCHLORIDE 0.4 MG/1
0.4 CAPSULE ORAL DAILY
COMMUNITY
Start: 2024-01-18

## 2024-01-28 RX ORDER — ACETAMINOPHEN 325 MG/1
650 TABLET ORAL EVERY 4 HOURS PRN
Status: DISCONTINUED | OUTPATIENT
Start: 2024-01-28 | End: 2024-01-30

## 2024-01-28 RX ORDER — ONDANSETRON 2 MG/ML
4 INJECTION INTRAMUSCULAR; INTRAVENOUS EVERY 6 HOURS PRN
Status: DISCONTINUED | OUTPATIENT
Start: 2024-01-28 | End: 2024-01-30 | Stop reason: HOSPADM

## 2024-01-28 RX ORDER — ACETAMINOPHEN 325 MG/1
650 TABLET ORAL EVERY 4 HOURS PRN
Status: DISCONTINUED | OUTPATIENT
Start: 2024-01-28 | End: 2024-01-30 | Stop reason: HOSPADM

## 2024-01-28 RX ORDER — ACETAMINOPHEN 500 MG
1000 TABLET ORAL ONCE
Status: COMPLETED | OUTPATIENT
Start: 2024-01-28 | End: 2024-01-28

## 2024-01-28 RX ORDER — LOSARTAN POTASSIUM 50 MG/1
1 TABLET ORAL DAILY
COMMUNITY
Start: 2024-01-18

## 2024-01-28 RX ORDER — L. ACIDOPHILUS/BIFIDO. LONGUM 15 MG
1 CAPSULE,DELAYED RELEASE (ENTERIC COATED) ORAL DAILY
COMMUNITY
Start: 2024-01-18

## 2024-01-28 RX ORDER — AMLODIPINE BESYLATE 10 MG/1
10 TABLET ORAL DAILY
Status: DISCONTINUED | OUTPATIENT
Start: 2024-01-28 | End: 2024-01-30 | Stop reason: HOSPADM

## 2024-01-28 RX ORDER — AMLODIPINE BESYLATE 10 MG/1
1 TABLET ORAL DAILY
COMMUNITY
Start: 2024-01-18

## 2024-01-28 RX ORDER — ATORVASTATIN CALCIUM 10 MG/1
10 TABLET, FILM COATED ORAL DAILY
Status: DISCONTINUED | OUTPATIENT
Start: 2024-01-28 | End: 2024-01-30 | Stop reason: HOSPADM

## 2024-01-28 RX ORDER — LIDOCAINE 40 MG/G
CREAM TOPICAL
Status: DISCONTINUED | OUTPATIENT
Start: 2024-01-28 | End: 2024-01-30 | Stop reason: HOSPADM

## 2024-01-28 RX ORDER — TAMSULOSIN HYDROCHLORIDE 0.4 MG/1
0.4 CAPSULE ORAL DAILY
Status: DISCONTINUED | OUTPATIENT
Start: 2024-01-28 | End: 2024-01-30 | Stop reason: HOSPADM

## 2024-01-28 RX ORDER — CALCIUM CARBONATE 500 MG/1
1000 TABLET, CHEWABLE ORAL 4 TIMES DAILY PRN
Status: DISCONTINUED | OUTPATIENT
Start: 2024-01-28 | End: 2024-01-30 | Stop reason: HOSPADM

## 2024-01-28 RX ORDER — CALCIUM CARBONATE 500 MG/1
1000 TABLET, CHEWABLE ORAL 4 TIMES DAILY PRN
Status: DISCONTINUED | OUTPATIENT
Start: 2024-01-28 | End: 2024-01-28

## 2024-01-28 RX ORDER — LANCETS
1 EACH MISCELLANEOUS DAILY
COMMUNITY
Start: 2024-01-18

## 2024-01-28 RX ADMIN — INSULIN ASPART 3 UNITS: 100 INJECTION, SOLUTION INTRAVENOUS; SUBCUTANEOUS at 17:30

## 2024-01-28 RX ADMIN — SODIUM CHLORIDE, POTASSIUM CHLORIDE, SODIUM LACTATE AND CALCIUM CHLORIDE: 600; 310; 30; 20 INJECTION, SOLUTION INTRAVENOUS at 16:52

## 2024-01-28 RX ADMIN — AMLODIPINE BESYLATE 10 MG: 10 TABLET ORAL at 17:04

## 2024-01-28 RX ADMIN — LOSARTAN POTASSIUM 50 MG: 50 TABLET, FILM COATED ORAL at 17:04

## 2024-01-28 RX ADMIN — ACETAMINOPHEN 650 MG: 325 TABLET, FILM COATED ORAL at 22:19

## 2024-01-28 RX ADMIN — INSULIN GLARGINE 10 UNITS: 100 INJECTION, SOLUTION SUBCUTANEOUS at 15:28

## 2024-01-28 RX ADMIN — SODIUM CHLORIDE, POTASSIUM CHLORIDE, SODIUM LACTATE AND CALCIUM CHLORIDE 1000 ML: 600; 310; 30; 20 INJECTION, SOLUTION INTRAVENOUS at 12:10

## 2024-01-28 RX ADMIN — TAMSULOSIN HYDROCHLORIDE 0.4 MG: 0.4 CAPSULE ORAL at 17:04

## 2024-01-28 RX ADMIN — ONDANSETRON 4 MG: 2 INJECTION INTRAMUSCULAR; INTRAVENOUS at 12:11

## 2024-01-28 RX ADMIN — CEFTRIAXONE SODIUM 1 G: 1 INJECTION, POWDER, FOR SOLUTION INTRAMUSCULAR; INTRAVENOUS at 15:27

## 2024-01-28 RX ADMIN — ATORVASTATIN CALCIUM 10 MG: 10 TABLET, FILM COATED ORAL at 17:04

## 2024-01-28 RX ADMIN — ACETAMINOPHEN 1000 MG: 500 TABLET, FILM COATED ORAL at 13:39

## 2024-01-28 ASSESSMENT — ACTIVITIES OF DAILY LIVING (ADL)
ADLS_ACUITY_SCORE: 35
ADLS_ACUITY_SCORE: 20

## 2024-01-28 NOTE — ED PROVIDER NOTES
HPI   Patient is a 73-year-old male presenting with fever and hyperglycemia.  He was seen on 1/11 with urinary symptoms and fever.  He was given cefuroxime twice daily for 7 days for urinary tract infection.  He had a CT scan of his head and abdomen/pelvis at that time as well, see those notes for details.  He has diabetes, taking semaglutide and metformin.  He has hypertension, taking losartan and amlodipine.  No alcohol recently.    The patient felt sick since yesterday afternoon/evening.  He had chills and fatigue.  This is continued over the course of the morning.  He describes coughing with spasms and then associated vomiting.  He tells me that the cough is not new and that he experiences it occasionally and that it is related to losartan.  He denies rhinorrhea or chest congestion.  No sore throat.  No headache.  No neck pain or stiffness.  No dental pain.  No facial pain or pressure.  No chest pain or shortness of breath.  He denies abdominal pain.  He denies nausea.  He denies skin rash.  He does have some myalgia.      Allergies:  Allergies   Allergen Reactions    Lisinopril Cough    Nkda [No Known Drug Allergy]      Problem List:    Patient Active Problem List    Diagnosis Date Noted    Anemia due to blood loss, acute 11/27/2018     Priority: Medium    Retention of urine 11/27/2018     Priority: Medium    BMI 34.0-34.9,adult 11/26/2018     Priority: Medium    History of tobacco use disorder 11/26/2018     Priority: Medium    Benign essential hypertension 01/05/2010     Priority: Medium    Pre-diabetes 01/05/2010     Priority: Medium      Past Medical History:    Past Medical History:   Diagnosis Date    Anemia due to blood loss, acute 11/27/2018    Arthritis     Asthma     BMI 34.0-34.9,adult 11/26/2018    Cough secondary to angiotensin converting enzyme inhibitor (ACE-I)     History of anesthesia complications     History of tobacco use disorder 11/26/2018    Hypertension     Nasal congestion 11/26/2018  "   Partial tear of right subscapularis tendon     Pre-diabetes     Rupture of long head biceps tendon, right, initial encounter     Tear of right infraspinatus tendon     Tear of right supraspinatus tendon     Type 2 diabetes mellitus with hyperglycemia, without long-term current use of insulin (H)     Urinary retention 2018     Past Surgical History:    Past Surgical History:   Procedure Laterality Date    COLONOSCOPY  2011    Procedure:COLONOSCOPY; Colonoscopy; Surgeon:GWYN MELVIN; Location:[facility]    JOINT REPLACEMENT Left       knee    RELEASE CARPAL TUNNEL      SPINAL FUSION      [facility] TOTAL KNEE ARTHROPLASTY Left 2017    Procedure: LEFT TOTAL KNEE ARTHROPLASTY;  Surgeon: Endy Baker MD;  Location: [facility];  Service: Orthopedics    [facility] TOTAL KNEE ARTHROPLASTY Right 2018    Procedure: RIGHT TOTAL KNEE ARTHROPLASTY;  Surgeon: Endy Baker MD;  Location: [facility];  Service: Orthopedics     Family History:    Family History   Problem Relation Age of Onset    Cancer Mother     Diabetes Father     Hypertension Father     Hypertension Paternal Uncle     Heart Disease Paternal Uncle      Social History:  Marital Status:   [2]  Social History     Tobacco Use    Smoking status: Former     Packs/day: 1.00     Years: 15.00     Additional pack years: 0.00     Total pack years: 15.00     Types: Cigarettes     Quit date: 1978     Years since quittin.8    Smokeless tobacco: Never   Substance Use Topics    Alcohol use: Yes     Alcohol/week: 28.0 standard drinks of alcohol     Comment: Alcoholic Drinks/day: 3 drinks     Drug use: No      Medications:    losartan (COZAAR) 25 MG tablet  mupirocin (BACTROBAN) 2 % external ointment      Review of Systems   All other systems reviewed and are negative.      PE   BP: 136/74  Pulse: (!) 130  Temp: (!) 101  F (38.3  C)  Resp: 18  Height: 180.3 cm (5' 11\")  Weight: 106.6 kg (235 lb)  SpO2: 96 %  Physical " Exam  Vitals and nursing note reviewed.   Constitutional:       Comments: He appears to be well but tired appearing.  Answering questions appropriately.   HENT:      Head: Atraumatic.      Right Ear: External ear normal.      Left Ear: External ear normal.      Nose: Nose normal.      Mouth/Throat:      Mouth: Mucous membranes are moist.      Pharynx: Oropharynx is clear.   Eyes:      General: No scleral icterus.     Extraocular Movements: Extraocular movements intact.      Conjunctiva/sclera: Conjunctivae normal.      Pupils: Pupils are equal, round, and reactive to light.   Cardiovascular:      Rate and Rhythm: Tachycardia present.   Pulmonary:      Effort: Pulmonary effort is normal. No respiratory distress.      Breath sounds: Normal breath sounds.   Abdominal:      Comments: Mild discomfort with palpation along the right abdomen.  No guarding.  Soft throughout.  No distention.  No organomegaly or mass.   Musculoskeletal:         General: Normal range of motion.      Cervical back: Normal range of motion.   Skin:     General: Skin is warm and dry.   Neurological:      Mental Status: He is alert and oriented to person, place, and time.   Psychiatric:         Behavior: Behavior normal.         ED COURSE and MDM   1226.  Patient has symptoms and signs as described above.  CBC, basic metabolic panel, hepatic panel, urine analysis, viral swabs pending.  LR fluid bolus.  Blood cultures, lactic acid, VBG added.    1317.  Recent urine culture positive for E. coli, greater than 100,000 colonies.  Susceptible to all antibiotics tested against it.    1405.  Patient continues to be tachycardic.  Tylenol just given by nursing.  Fever 101.4.  Blood pressure 128/73.  Ceftriaxone ordered for urinary tract infection.  Recent CT unremarkable except for bladder inflammation.    1412.  Patient accepted by the hospital team.  No additional orders requested.    EKG  (1236)   Interpretation performed by me.  Rate: 111     Rhythm:  sinus     Axis: nl  Intervals: ID (12-2) 144, QRS (<12) 94, QTc (>5) 395  P wave: nl     QRS complex: nl   ST segment / T-wave: nl  Conclusion: sinus tach.    Electronic medical chart reviewed, including medical problems, medications, medical allergies, social history.  Recent hospitalizations and surgical procedures reviewed.  Recent clinic visits and consultations reviewed.  Recent labs and test results reviewed.  Nursing notes reviewed.    The patient, their parent if applicable, and/or their medical decision maker(s) and I have reviewed all of the available historical information, applicable PMH, physical exam findings, and objective diagnostic data gathered during this ED visit.  We then discussed all work-up options and then together agreed upon the course taken during this visit.  The ultimate disposition and plan was a cooperative decision made between myself and the patient, their parent if applicable, and/or their legal decision maker(s).  The risks and benefits of all decisions made during this visit were discussed to the best of my abilities given the circumstances, and all parties are understanding of the pertinent ramifications of these decisions.      LABS  Labs Ordered and Resulted from Time of ED Arrival to Time of ED Departure   GLUCOSE BY METER - Abnormal       Result Value    GLUCOSE BY METER POCT 286 (*)    BASIC METABOLIC PANEL - Abnormal    Sodium 132 (*)     Potassium 4.3      Chloride 98      Carbon Dioxide (CO2) 20 (*)     Anion Gap 14      Urea Nitrogen 15.8      Creatinine 0.75      GFR Estimate >90      Calcium 9.5      Glucose 310 (*)    CBC WITH PLATELETS AND DIFFERENTIAL - Abnormal    WBC Count 10.1      RBC Count 4.68      Hemoglobin 13.8      Hematocrit 39.0 (*)     MCV 83      MCH 29.5      MCHC 35.4      RDW 13.2      Platelet Count 207      % Neutrophils 91      % Lymphocytes 4      % Monocytes 5      % Eosinophils 0      % Basophils 0      % Immature Granulocytes 0      NRBCs  per 100 WBC 0      Absolute Neutrophils 9.1 (*)     Absolute Lymphocytes 0.4 (*)     Absolute Monocytes 0.5      Absolute Eosinophils 0.0      Absolute Basophils 0.0      Absolute Immature Granulocytes 0.0      Absolute NRBCs 0.0     UA MACROSCOPIC WITH REFLEX TO MICRO AND CULTURE - Abnormal    Color Urine Yellow      Appearance Urine Cloudy (*)     Glucose Urine >499 (*)     Bilirubin Urine Negative      Ketones Urine Negative      Specific Gravity Urine 1.021      Blood Urine Small (*)     pH Urine 6.0      Protein Albumin Urine 30 (*)     Urobilinogen Urine Normal      Nitrite Urine Positive (*)     Leukocyte Esterase Urine Moderate (*)     Bacteria Urine Moderate (*)     WBC Clumps Urine Present (*)     Mucus Urine Present (*)     RBC Urine 12 (*)     WBC Urine >182 (*)     Squamous Epithelials Urine <1     HEPATIC FUNCTION PANEL - Abnormal    Protein Total 7.4      Albumin 4.0      Bilirubin Total 1.1      Alkaline Phosphatase 76      AST 23      ALT 37      Bilirubin Direct 0.33 (*)    BLOOD GAS VENOUS - Abnormal    pH Venous 7.45 (*)     pCO2 Venous 39 (*)     pO2 Venous 38      Bicarbonate Venous 27      Base Excess/Deficit Venous 3.0      FIO2 21      Oxyhemoglobin Venous 73      O2 Sat, Venous 74.9     LACTIC ACID WHOLE BLOOD - Normal    Lactic Acid 1.9     INFLUENZA A/B, RSV, & SARS-COV2 PCR - Normal    Influenza A PCR Negative      Influenza B PCR Negative      RSV PCR Negative      SARS CoV2 PCR Negative     HEMOGLOBIN A1C   BLOOD CULTURE   BLOOD CULTURE   URINE CULTURE       IMAGING  Images reviewed by me.  Radiology report also reviewed.  No orders to display       Procedures    Medications   cefTRIAXone (ROCEPHIN) 1 g vial to attach to  mL bag for ADULTS or NS 50 mL bag for PEDS (has no administration in time range)   ondansetron (ZOFRAN) injection 4 mg (4 mg Intravenous $Given 1/28/24 1211)   lactated ringers BOLUS 1,000 mL (0 mLs Intravenous Stopped 1/28/24 1308)   acetaminophen (TYLENOL)  tablet 1,000 mg (1,000 mg Oral $Given 1/28/24 4870)         IMPRESSION       ICD-10-CM    1. Urinary tract infection without hematuria, site unspecified  N39.0                Medication List      There are no discharge medications for this visit.                       Roverto Patel MD  01/28/24 7088

## 2024-01-28 NOTE — ED NOTES
"[facility]   Admission Handoff    The patient is Taj Velazquez Sr., 73 year old who arrived in the ED by CAR from home with a complaint of Hyperglycemia and Generalized Weakness  . The patient's current symptoms are new and during this time the symptoms have remained the same. In the ED, patient was diagnosed with   Final diagnoses:   Urinary tract infection without hematuria, site unspecified         Needed?: No    Allergies:    Allergies   Allergen Reactions    Lisinopril Cough    Nkda [No Known Drug Allergy]        Past Medical Hx:   Past Medical History:   Diagnosis Date    Anemia due to blood loss, acute 11/27/2018    Arthritis     Asthma     BMI 34.0-34.9,adult 11/26/2018    Cough secondary to angiotensin converting enzyme inhibitor (ACE-I)     History of anesthesia complications     Prolonged awakening time with spinal surgery    History of tobacco use disorder 11/26/2018    Hypertension     Nasal congestion 11/26/2018    Partial tear of right subscapularis tendon     Pre-diabetes     Rupture of long head biceps tendon, right, initial encounter     Tear of right infraspinatus tendon     Tear of right supraspinatus tendon     Type 2 diabetes mellitus with hyperglycemia, without long-term current use of insulin (H)     Urinary retention 11/27/2018       Initial vitals were: BP: 136/74  Pulse: (!) 130  Temp: (!) 101  F (38.3  C)  Resp: 18  Height: 180.3 cm (5' 11\")  Weight: 106.6 kg (235 lb)  SpO2: 96 %   Recent vital Signs: /73   Pulse 108   Temp (!) 101.4  F (38.6  C) (Oral)   Resp 16   Ht 1.803 m (5' 11\")   Wt 106.6 kg (235 lb)   SpO2 93%   BMI 32.78 kg/m      Elimination Status: Continent: Yes     Activity Level: SBA    Fall Status: Reason for falls risk:  Mobility and Reason for falls risk: High Risk Medications  bed/chair alarm on, nonskid shoes/slippers when out of bed, arm band in place, patient and family education, and assistive device/personal " items within reach    Baseline Mental status: WDL  Current Mental Status changes: at basesline    Infection present or suspected this encounter: yes urinary  Sepsis suspected: No    Isolation type: N/A    Bariatric equipment needed?: No    In the ED these meds were given:   Medications   cefTRIAXone (ROCEPHIN) 1 g vial to attach to  mL bag for ADULTS or NS 50 mL bag for PEDS (has no administration in time range)   insulin glargine (LANTUS PEN) injection 10 Units (has no administration in time range)   ondansetron (ZOFRAN) injection 4 mg (4 mg Intravenous $Given 1/28/24 1211)   lactated ringers BOLUS 1,000 mL (0 mLs Intravenous Stopped 1/28/24 1308)   acetaminophen (TYLENOL) tablet 1,000 mg (1,000 mg Oral $Given 1/28/24 1339)       Drips running?  No/ YES  Home pump  No    Current LDAs: Peripheral IV: Site LAC Gauge 18  lactated Ringer's     Results:   Labs/Imaging  Ordered and Resulted from Time of ED Arrival Up to the Time of Departure from the ED  Results for orders placed or performed during the hospital encounter of 01/28/24 (from the past 24 hour(s))   Glucose by meter   Result Value Ref Range    GLUCOSE BY METER POCT 286 (H) 70 - 99 mg/dL   [place] Draw    Narrative    The following orders were created for panel order [place] Draw.  Procedure                               Abnormality         Status                     ---------                               -----------         ------                     Extra Blood Culture Bottle[[id number]]                       Final result               Extra Red Top Tube[[id number]]                               Final result               Extra Green Top (Lithium...[[id number]]                                                 Extra Purple Top Tube[[id number]]                            Final result               Extra Green Top (Lithium...[[id number]]                      Final result                 Please view results for these tests on the individual orders.   Extra  Blood Culture Bottle   Result Value Ref Range    Hold Specimen JIC    Extra Red Top Tube   Result Value Ref Range    Hold Specimen JIC    Extra Purple Top Tube   Result Value Ref Range    Hold Specimen JIC    CBC with platelets, differential    Narrative    The following orders were created for panel order CBC with platelets, differential.  Procedure                               Abnormality         Status                     ---------                               -----------         ------                     CBC with platelets and d...[[id number]]  Abnormal            Final result                 Please view results for these tests on the individual orders.   CBC with platelets and differential   Result Value Ref Range    WBC Count 10.1 4.0 - 11.0 10e3/uL    RBC Count 4.68 4.40 - 5.90 10e6/uL    Hemoglobin 13.8 13.3 - 17.7 g/dL    Hematocrit 39.0 (L) 40.0 - 53.0 %    MCV 83 78 - 100 fL    MCH 29.5 26.5 - 33.0 pg    MCHC 35.4 31.5 - 36.5 g/dL    RDW 13.2 10.0 - 15.0 %    Platelet Count 207 150 - 450 10e3/uL    % Neutrophils 91 %    % Lymphocytes 4 %    % Monocytes 5 %    % Eosinophils 0 %    % Basophils 0 %    % Immature Granulocytes 0 %    NRBCs per 100 WBC 0 <1 /100    Absolute Neutrophils 9.1 (H) 1.6 - 8.3 10e3/uL    Absolute Lymphocytes 0.4 (L) 0.8 - 5.3 10e3/uL    Absolute Monocytes 0.5 0.0 - 1.3 10e3/uL    Absolute Eosinophils 0.0 0.0 - 0.7 10e3/uL    Absolute Basophils 0.0 0.0 - 0.2 10e3/uL    Absolute Immature Granulocytes 0.0 <=0.4 10e3/uL    Absolute NRBCs 0.0 10e3/uL   Hemoglobin A1c   Result Value Ref Range    Hemoglobin A1C 10.0 (H) <5.7 %   Symptomatic Influenza A/B, RSV, & SARS-CoV2 PCR (COVID-19) Nasopharyngeal    Specimen: Nasopharyngeal; Swab   Result Value Ref Range    Influenza A PCR Negative Negative    Influenza B PCR Negative Negative    RSV PCR Negative Negative    SARS CoV2 PCR Negative Negative    Narrative    Testing was performed using the Xpert Xpress CoV2/Flu/RSV Assay on the [employer]  GeneXpert Instrument. This test should be ordered for the detection of SARS-CoV-2, influenza, and RSV viruses in individuals who meet clinical and/or epidemiological criteria. Test performance is unknown in asymptomatic patients. This test is for in vitro diagnostic use under the FDA EUA for laboratories certified under CLIA to perform high or moderate complexity testing. This test has not been FDA cleared or approved. A negative result does not rule out the presence of PCR inhibitors in the specimen or target RNA in concentration below the limit of detection for the assay. If only one viral target is positive but coinfection with multiple targets is suspected, the sample should be re-tested with another FDA cleared, approved, or authorized test, if coinfection would change clinical management. This test was validated by the [facility]. These laboratories are certified under the Clinical Laboratory Improvement Amendments of 1988 (CLIA-88) as qualified to perform high complexity laboratory testing.   [place] Draw    Narrative    The following orders were created for panel order [place] Draw.  Procedure                               Abnormality         Status                     ---------                               -----------         ------                     Extra Blue Top Tube[[id number]]                              Final result                 Please view results for these tests on the individual orders.   Extra Blue Top Tube   Result Value Ref Range    Hold Specimen [facility]    Lactic Acid STAT   Result Value Ref Range    Lactic Acid 1.9 0.7 - 2.0 mmol/L   Extra Green Top (Lithium Heparin) ON ICE   Result Value Ref Range    Hold Specimen [facility]    Basic metabolic panel   Result Value Ref Range    Sodium 132 (L) 135 - 145 mmol/L    Potassium 4.3 3.4 - 5.3 mmol/L    Chloride 98 98 - 107 mmol/L    Carbon Dioxide (CO2) 20 (L) 22 - 29 mmol/L    Anion Gap 14 7 - 15 mmol/L    Urea Nitrogen 15.8 8.0 -  23.0 mg/dL    Creatinine 0.75 0.67 - 1.17 mg/dL    GFR Estimate >90 >60 mL/min/1.73m2    Calcium 9.5 8.8 - 10.2 mg/dL    Glucose 310 (H) 70 - 99 mg/dL   Hepatic panel   Result Value Ref Range    Protein Total 7.4 6.4 - 8.3 g/dL    Albumin 4.0 3.5 - 5.2 g/dL    Bilirubin Total 1.1 <=1.2 mg/dL    Alkaline Phosphatase 76 40 - 150 U/L    AST 23 0 - 45 U/L    ALT 37 0 - 70 U/L    Bilirubin Direct 0.33 (H) 0.00 - 0.30 mg/dL   Blood gas venous   Result Value Ref Range    pH Venous 7.45 (H) 7.32 - 7.43    pCO2 Venous 39 (L) 40 - 50 mm Hg    pO2 Venous 38 25 - 47 mm Hg    Bicarbonate Venous 27 21 - 28 mmol/L    Base Excess/Deficit Venous 3.0 -3.0 - 3.0 mmol/L    FIO2 21     Oxyhemoglobin Venous 73 70 - 75 %    O2 Sat, Venous 74.9 70.0 - 75.0 %    Narrative    In healthy individuals, oxyhemoglobin (O2Hb) and oxygen saturation (SO2) are approximately equal. In the presence of dyshemoglobins, oxyhemoglobin can be considerably lower than oxygen saturation.   UA Macroscopic with reflex to Microscopic and Culture    Specimen: Urine, Clean Catch   Result Value Ref Range    Color Urine Yellow Colorless, Straw, Light Yellow, Yellow    Appearance Urine Cloudy (A) Clear    Glucose Urine >499 (A) Negative mg/dL    Bilirubin Urine Negative Negative    Ketones Urine Negative Negative mg/dL    Specific Gravity Urine 1.021 1.003 - 1.035    Blood Urine Small (A) Negative    pH Urine 6.0 5.0 - 7.0    Protein Albumin Urine 30 (A) Negative mg/dL    Urobilinogen Urine Normal Normal, 2.0 mg/dL    Nitrite Urine Positive (A) Negative    Leukocyte Esterase Urine Moderate (A) Negative    Bacteria Urine Moderate (A) None Seen /HPF    WBC Clumps Urine Present (A) None Seen /HPF    Mucus Urine Present (A) None Seen /LPF    RBC Urine 12 (H) <=2 /HPF    WBC Urine >182 (H) <=5 /HPF    Squamous Epithelials Urine <1 <=1 /HPF    Narrative    Urine Culture ordered based on laboratory criteria       For the majority of the shift this patient's behavior was  Green     Cardiac Rhythm: Tachycardia  Pt needs tele? No  Skin/wound Issues: None    Code Status: Full Code    Pain control: pt had none    Nausea control: pt had none    Abnormal labs/tests/findings requiring intervention: UA    Patient tested for COVID 19 prior to admission: YES     OBS brochure/video discussed/provided to patient/family: Yes     Family present during ED course? Yes     Family Comments/Social Situation comments: Spouse at bedside    Tasks needing completion: None    Rita Whipple RN

## 2024-01-28 NOTE — ED NOTES
"Patient has  [place] to Observation  order. Patient has been given the Observation brochure -  What does Observation mean to me.\"  Patient has been given the opportunity to ask questions about observation status and their plan of care.      Francisco Nelson RN     "

## 2024-01-28 NOTE — H&P
[facility]    History and Physical  Hospital Medicine       Date of Admission:  1/28/2024  Date of Service: 1/28/2024     Assessment & Plan   Taj Velazquez Sr. is a 73 year old male with a medical history of HTN, poorly controlled T2DM, recent UTI who presents on 1/28/2024 with fatigue, chills, and urinary frequency. He is being admitted under observation for treatment of UTI & urosepsis.     Urinary tract infection   Urosepsis    Presents with fatigue urinary frequency, chills. Septic based on febrile (101F) and tachycardic (130bpm). Sepsis source most likely UTI as UA appears infected (cloudy, positive nitrite, small blood, moderate leuk esterase with >182 WBC, 12 RBC, moderate bacteria with WBC clumps. Good sample with <1 squamous cells).     Notably, patient recently finished cefuroxime treatment for pan-sensitive e. Coli UTI diagnosed 1/11/24. Poorly controlled diabetes may be making patient higher risk for recurrent infections.   -Ceftriaxone 2g IV Q24hrs  -Lactated ringer 100/hr continuous for gentle repletion  -Urine culture 1/28 NGTD  -Blood cultures x2 1/28 NGTD  -acetaminophen for mild pain or fever    Benign essential hypertension  Mildly hypertensive, 's on admission. Managed PTA with losartan 50mg daily & amlodipine 10mg daily.   -Continue PTA amlodipine & losartan with hold parameters    Type 2 diabetes mellitus  Poorly controlled (hemoglobin A1c 1/28/24 is 10.0), with hyperglycemia on presentation (glucose 310). Managed PTA with metformin 1500mg daily (higher doses caused GI upset). Trulicity was added at last PCP visit 1/18/24, but patient says this was $400 per shot when he went to the pharmacy so he did not fill the prescription. Based on A1c, patient qualifies for home insulin management. He is apprehensive about frequent injections.   -Initiate insulin glargine 10units daily before breakfast  -Medium sliding scale insulin during admission  -Carb counting by  "RN  -Hypoglycemia monitoring & treatment protocols in place  -Continue PTA metformin  -Care management consult to assess for possible coverage work-around for Trulicity  -Would likely benefit from outpatient diabetes educator referral    Pseudohyponatremia  Very mild, Na 132 on admission, is 136 when corrected for hyperglycemia.   -BMP in AM    ASCVD risk management  No documented history of hyperlipidemia. Taking atorvastatin 10mg daily, continue.     BMI 34.0-34.9  Obesity contributes to overall increased morbidity and mortality.     Clinically Significant Risk Factors Present on Admission                  # Hypertension: Noted on problem list     # DMII: A1C = 10.0 % (Ref range: <5.7 %) within past 6 months    # Obesity: Estimated body mass index is 32.78 kg/m  as calculated from the following:    Height as of this encounter: 1.803 m (5' 11\").    Weight as of this encounter: 106.6 kg (235 lb).               Diet:  moderate consistent carb  DVT Prophylaxis: Ambulate every shift  Aguero Catheter: Not present  Code Status:  DNR, DNI  Lines: PIV    Disposition Plan      Expected Discharge Date: 01/29/2024             Entered: Erin Quinn PA-C 01/28/2024, 3:20 PM     Status: Patient is appropriate for observation  Erin Quinn PA-C        The patient's care was discussed with the Attending Physician, Dr. Kraig Sewell, bedside RN, and the patient .    Primary Care Physician   Semmler, Steven Duane [phone]    History is obtained from the patient, who is a decent historian, handoff from ER provider, and review of old records via the EMR.    History of Present Illness   Taj Velazquez  is a 73 year old male with past medical history of T2DM, tobacco use, hypertension now presents on 1/28/2024 with fatigue, chills, and urinary frequency.     Patient has a history of UTI in 1/11/24 growing >100k pan-sensitive e. Coli, treated with cefuroxime which patient completed. He was feeling back to baseline " and really good until about 1 week PTA.     Around 1 week PTA he had a recurrence of weakness & apathy. Was sitting around at home with no desire to do anything & mild generalized weakness making it difficult for him to get up and feel motivated to complete daily tasks. Woke up around 4am on 1/28 and felt very cold with the chills. Attempted to eat some jello, but had a coughing fit which caused him to vomit. Denies abdominal pain, nausea, or other vomiting aside from this episode. Patient endorses worsening chills & rigors, so went back to bed, but his son woke him up to take him to the ER.    These constitutional symptoms feels the same as symptoms previously with UTI. Patient does say last time he had dysuria & hematuria, whereas this time around he only noticed some increased urinary frequency.  Denies dysuria, urgency, or incontinence currently. Denies flank or back pain.     Upon arrival to ER patient received lab workup. He was initiated on ceftriaxone.     Review of Systems   Constitutional: denies weight loss  Eyes: denies changes in vision  HENT: denies changes in hearing  Respiratory: denies dyspnea. Has chronic dry cough unchanged from baseline.   Cardiovascular: denies chest pain, heart palpitations, lightheadedness, syncope, limb swelling  Gastroenterology: denies constipation, diarrhea, GERD symptoms  Genitourinary: see HPI  Integumentary: no new rashes or skin changes  Musculoskeletal: denies new muscle pain or joint trauma  Neuro: denies numbness, tingling,  tremor  Psychiatric: denies significant changes to mood    Past Medical History     Type 2 diabetes mellitus, without long-term current use of insulin (H) 01/28/2024     Priority: Medium    Anemia due to blood loss, acute 11/27/2018     Priority: Medium    Retention of urine 11/27/2018     Priority: Medium    BMI 34.0-34.9,adult 11/26/2018     Priority: Medium    History of tobacco use disorder 11/26/2018     Priority: Medium    Benign essential  hypertension 2010     Priority: Medium      Past Surgical History   Past Surgical History:   Procedure Laterality Date    COLONOSCOPY  2011    Procedure:COLONOSCOPY; Colonoscopy; Surgeon:GWYN MELVIN; Location:[facility]    JOINT REPLACEMENT Left       knee    RELEASE CARPAL TUNNEL      SPINAL FUSION      [facility] TOTAL KNEE ARTHROPLASTY Left 2017    Procedure: LEFT TOTAL KNEE ARTHROPLASTY;  Surgeon: Endy Baker MD;  Location: [facility];  Service: Orthopedics    [facility] TOTAL KNEE ARTHROPLASTY Right 2018    Procedure: RIGHT TOTAL KNEE ARTHROPLASTY;  Surgeon: Endy Baker MD;  Location: [facility];  Service: Orthopedics      Prior to Admission Medications   Prior to Admission Medications   Prescriptions Last Dose Informant Patient Reported? Taking?   ARMINDAOGEPINKY BLOOD GLUCOSE TEST test strip 2024 at am #340 Self Yes Yes   Si strip by In Vitro route daily   amLODIPine (NORVASC) 10 MG tablet 2024 at am Self Yes Yes   Sig: Take 1 tablet by mouth daily   atorvastatin (LIPITOR) 10 MG tablet 2024 at am Self Yes Yes   Sig: Take 10 mg by mouth daily   blood glucose monitoring (SOFTCLIX) lancets 2024 at am Self Yes Yes   Si each daily   fluticasone (FLONASE) 50 MCG/ACT nasal spray 2024 at am Self Yes Yes   Sig: Spray 1 spray into both nostrils daily as needed for rhinitis   losartan (COZAAR) 50 MG tablet 2024 at am Self Yes Yes   Sig: Take 1 tablet by mouth daily   metFORMIN (GLUCOPHAGE XR) 500 MG 24 hr tablet 2024 at am Self Yes Yes   Sig: Take 1,500 mg by mouth daily (with breakfast)   tamsulosin (FLOMAX) 0.4 MG capsule 2024 at am Self Yes Yes   Sig: Take 0.4 mg by mouth daily      Facility-Administered Medications: None     Allergies   Allergies   Allergen Reactions    Lisinopril Cough    Nkda [No Known Drug Allergy]      Family History    Family History   Problem Relation Age of Onset    Cancer Mother     Diabetes Father      "Hypertension Father     Hypertension Paternal Uncle     Heart Disease Paternal Uncle      Social History   Social History     Socioeconomic History    Marital status:      Physical Exam   /73   Pulse 108   Temp (!) 101.4  F (38.6  C) (Oral)   Resp 16   Ht 1.803 m (5' 11\")   Wt 106.6 kg (235 lb)   SpO2 93%   BMI 32.78 kg/m       Weight: 235 lbs 0 oz Body mass index is 32.78 kg/m .     Constitutional: Alert, oriented, cooperative, no apparent distress, appears nontoxic  Eyes: Eyes are clear  HENT: Oropharynx is clear. No evidence of cranial trauma.  Cardiovascular: Regular rate and rhythm, normal S1 and S2, and no murmur noted.  Good peripheral pulses in wrists bilaterally. No pitting lower extremity edema.  Respiratory: Clear to auscultation bilaterally. Unlabored on room air.   GI: Protuberant but soft, non-tender, normal bowel sounds, non-distended.   Genitourinary: Deferred  Musculoskeletal: Normal muscle bulk, no obvious joint deformities.   Skin: Warm and dry, no rashes. Mild flushing on upper chest.   Neurologic: Neck supple. Cranial nerves are grossly intact.  is symmetric.     Data   Data reviewed today:   Recent Labs   Lab 01/28/24  1213 01/28/24  1205 01/28/24  1157   WBC  --  10.1  --    HGB  --  13.8  --    MCV  --  83  --    PLT  --  207  --    *  --   --    POTASSIUM 4.3  --   --    CHLORIDE 98  --   --    CO2 20*  --   --    BUN 15.8  --   --    CR 0.75  --   --    ANIONGAP 14  --   --    AUGUSTIN 9.5  --   --    *  --  286*   ALBUMIN 4.0  --   --    PROTTOTAL 7.4  --   --    BILITOTAL 1.1  --   --    ALKPHOS 76  --   --    ALT 37  --   --    AST 23  --   --      I personally reviewed the EKG tracing showing sinus tachycardia.     "

## 2024-01-28 NOTE — ED TRIAGE NOTES
Pt here with fevers, chills, vomiting and weakness that started this AM. BG is in the 400s. Unable to take his metformin d/t vomiting. Recently completed antibiotics for a UTI.      Triage Assessment (Adult)       Row Name 01/28/24 1148          Triage Assessment    Airway WDL WDL        Respiratory WDL    Respiratory WDL WDL        Skin Circulation/Temperature WDL    Skin Circulation/Temperature WDL WDL        Cardiac WDL    Cardiac WDL WDL        Peripheral/Neurovascular WDL    Peripheral Neurovascular WDL WDL        Cognitive/Neuro/Behavioral WDL    Cognitive/Neuro/Behavioral WDL WDL

## 2024-01-28 NOTE — PROGRESS NOTES
Skin affirmation note    Admitting nurse completed full skin assessment, Cristofer score and Cristofer interventions. This writer agrees with the initial skin assessment findings.

## 2024-01-28 NOTE — MEDICATION SCRIBE - ADMISSION MEDICATION HISTORY
Medication Scribe Admission Medication History    Admission medication history is complete. The information provided in this note is only as accurate as the sources available at the time of the update.    Information Source(s): Patient, Clinic records, CareEverywhere/SureScripts, and meds from outside sources  via  in room with patient and finished at desk.   All of the meds on PTA list are new to the list.    Pertinent Information: He takes all of his meds in the morning so he doesn't forget them.    Changes made to PTA medication list:  Added: Amlodipine 10 mg, Atorvastatin 10 mg, blood glucose test strip and lancet, Fluticasone nasal spray, Losartan 50 mg, Metformin  mg, Tamsulosin 0.4 mg.  Deleted: Losartan 25 mg, Mupirocin 2%.  Changed: None    Allergies reviewed with patient and updates made in EHR: yes, no change.    Medication History Completed By: Jessica Segal 1/28/2024 2:50 PM    PTA Med List   Medication Sig Last Dose    amLODIPine (NORVASC) 10 MG tablet Take 1 tablet by mouth daily 1/27/2024 at am    atorvastatin (LIPITOR) 10 MG tablet Take 10 mg by mouth daily 1/27/2024 at am    blood glucose monitoring (SOFTCLIX) lancets 1 each daily 1/27/2024 at am    fluticasone (FLONASE) 50 MCG/ACT nasal spray Spray 1 spray into both nostrils daily as needed for rhinitis 1/27/2024 at am    YANIRA BLOOD GLUCOSE TEST test strip 1 strip by In Vitro route daily 1/27/2024 at am #340    losartan (COZAAR) 50 MG tablet Take 1 tablet by mouth daily 1/27/2024 at am    metFORMIN (GLUCOPHAGE XR) 500 MG 24 hr tablet Take 1,500 mg by mouth daily (with breakfast) 1/27/2024 at am    tamsulosin (FLOMAX) 0.4 MG capsule Take 0.4 mg by mouth daily 1/27/2024 at am

## 2024-01-28 NOTE — PROGRESS NOTES
[place] [facility] [place]    Patient admitted to room 2211 at approximately 1700 via wheel chair from emergency room. Patient was accompanied by staff.    Verbal SBAR report received from KAMARI Singh RN prior to patient arrival.     Patient ambulated to bed independently. Patient alert and oriented X 3. The patient is not having any pain. Admission vital signs: see flowsheets. Patient was oriented to plan of care, call light, bed controls, tv, telephone, bathroom, and visiting hours.     Risk Assessment    The following safety risks were identified during admission: fall and skin. Yellow risk band applied: NA    Skin Initial Assessment    This writer admitted this patient and completed a full skin assessment and Cristofer score in the Adult PCS flowsheet. Appropriate interventions initiated as needed.     Secondary skin check completed by Sendy SMYTH RN         Education    Patient has a [place] to Observation order: Yes  Observation education completed and documented: Yes      Nora Hatfield RN

## 2024-01-29 LAB
ACINETOBACTER SPECIES: NOT DETECTED
ANION GAP SERPL CALCULATED.3IONS-SCNC: 13 MMOL/L (ref 7–15)
BACTERIA UR CULT: ABNORMAL
BUN SERPL-MCNC: 19.6 MG/DL (ref 8–23)
CALCIUM SERPL-MCNC: 8.7 MG/DL (ref 8.8–10.2)
CHLORIDE SERPL-SCNC: 95 MMOL/L (ref 98–107)
CITROBACTER SPECIES: NOT DETECTED
CREAT SERPL-MCNC: 0.89 MG/DL (ref 0.67–1.17)
CTX-M: NOT DETECTED
DEPRECATED HCO3 PLAS-SCNC: 23 MMOL/L (ref 22–29)
EGFRCR SERPLBLD CKD-EPI 2021: >90 ML/MIN/1.73M2
ENTEROBACTER SPECIES: NOT DETECTED
ERYTHROCYTE [DISTWIDTH] IN BLOOD BY AUTOMATED COUNT: 13.3 % (ref 10–15)
ESCHERICHIA COLI: DETECTED
GLUCOSE BLDC GLUCOMTR-MCNC: 239 MG/DL (ref 70–99)
GLUCOSE BLDC GLUCOMTR-MCNC: 247 MG/DL (ref 70–99)
GLUCOSE BLDC GLUCOMTR-MCNC: 269 MG/DL (ref 70–99)
GLUCOSE BLDC GLUCOMTR-MCNC: 277 MG/DL (ref 70–99)
GLUCOSE BLDC GLUCOMTR-MCNC: 298 MG/DL (ref 70–99)
GLUCOSE SERPL-MCNC: 255 MG/DL (ref 70–99)
HCT VFR BLD AUTO: 34.2 % (ref 40–53)
HGB BLD-MCNC: 11.7 G/DL (ref 13.3–17.7)
IMP: NOT DETECTED
KLEBSIELLA OXYTOCA: NOT DETECTED
KLEBSIELLA PNEUMONIAE: NOT DETECTED
KPC: NOT DETECTED
MCH RBC QN AUTO: 29.2 PG (ref 26.5–33)
MCHC RBC AUTO-ENTMCNC: 34.2 G/DL (ref 31.5–36.5)
MCV RBC AUTO: 85 FL (ref 78–100)
NDM: NOT DETECTED
OXA (DETECTED/NOT DETECTED): NOT DETECTED
PLATELET # BLD AUTO: 171 10E3/UL (ref 150–450)
POTASSIUM SERPL-SCNC: 4.1 MMOL/L (ref 3.4–5.3)
PROTEUS SPECIES: NOT DETECTED
PSEUDOMONAS AERUGINOSA: NOT DETECTED
RBC # BLD AUTO: 4.01 10E6/UL (ref 4.4–5.9)
SODIUM SERPL-SCNC: 131 MMOL/L (ref 135–145)
VIM: NOT DETECTED
WBC # BLD AUTO: 7 10E3/UL (ref 4–11)

## 2024-01-29 PROCEDURE — 250N000013 HC RX MED GY IP 250 OP 250 PS 637: Performed by: INTERNAL MEDICINE

## 2024-01-29 PROCEDURE — 120N000001 HC R&B MED SURG/OB

## 2024-01-29 PROCEDURE — 96376 TX/PRO/DX INJ SAME DRUG ADON: CPT

## 2024-01-29 PROCEDURE — 85027 COMPLETE CBC AUTOMATED: CPT | Performed by: STUDENT IN AN ORGANIZED HEALTH CARE EDUCATION/TRAINING PROGRAM

## 2024-01-29 PROCEDURE — G0378 HOSPITAL OBSERVATION PER HR: HCPCS

## 2024-01-29 PROCEDURE — 99232 SBSQ HOSP IP/OBS MODERATE 35: CPT | Performed by: INTERNAL MEDICINE

## 2024-01-29 PROCEDURE — 250N000011 HC RX IP 250 OP 636: Performed by: INTERNAL MEDICINE

## 2024-01-29 PROCEDURE — 250N000011 HC RX IP 250 OP 636

## 2024-01-29 PROCEDURE — 80048 BASIC METABOLIC PNL TOTAL CA: CPT | Performed by: STUDENT IN AN ORGANIZED HEALTH CARE EDUCATION/TRAINING PROGRAM

## 2024-01-29 PROCEDURE — 82962 GLUCOSE BLOOD TEST: CPT

## 2024-01-29 PROCEDURE — 36415 COLL VENOUS BLD VENIPUNCTURE: CPT | Performed by: STUDENT IN AN ORGANIZED HEALTH CARE EDUCATION/TRAINING PROGRAM

## 2024-01-29 PROCEDURE — 258N000003 HC RX IP 258 OP 636: Performed by: STUDENT IN AN ORGANIZED HEALTH CARE EDUCATION/TRAINING PROGRAM

## 2024-01-29 PROCEDURE — 250N000013 HC RX MED GY IP 250 OP 250 PS 637

## 2024-01-29 RX ORDER — CEFTRIAXONE 2 G/1
2 INJECTION, POWDER, FOR SOLUTION INTRAMUSCULAR; INTRAVENOUS EVERY 24 HOURS
Status: DISCONTINUED | OUTPATIENT
Start: 2024-01-30 | End: 2024-01-30 | Stop reason: HOSPADM

## 2024-01-29 RX ORDER — CEFTRIAXONE 1 G/1
1 INJECTION, POWDER, FOR SOLUTION INTRAMUSCULAR; INTRAVENOUS ONCE
Status: COMPLETED | OUTPATIENT
Start: 2024-01-29 | End: 2024-01-29

## 2024-01-29 RX ORDER — PIOGLITAZONEHYDROCHLORIDE 15 MG/1
15 TABLET ORAL DAILY
Status: DISCONTINUED | OUTPATIENT
Start: 2024-01-29 | End: 2024-01-30 | Stop reason: HOSPADM

## 2024-01-29 RX ADMIN — CEFTRIAXONE SODIUM 1 G: 1 INJECTION, POWDER, FOR SOLUTION INTRAMUSCULAR; INTRAVENOUS at 16:13

## 2024-01-29 RX ADMIN — METFORMIN HYDROCHLORIDE 1500 MG: 750 TABLET, EXTENDED RELEASE ORAL at 08:04

## 2024-01-29 RX ADMIN — INSULIN ASPART 3 UNITS: 100 INJECTION, SOLUTION INTRAVENOUS; SUBCUTANEOUS at 08:06

## 2024-01-29 RX ADMIN — INSULIN ASPART 3 UNITS: 100 INJECTION, SOLUTION INTRAVENOUS; SUBCUTANEOUS at 11:23

## 2024-01-29 RX ADMIN — SODIUM CHLORIDE, POTASSIUM CHLORIDE, SODIUM LACTATE AND CALCIUM CHLORIDE: 600; 310; 30; 20 INJECTION, SOLUTION INTRAVENOUS at 03:04

## 2024-01-29 RX ADMIN — TAMSULOSIN HYDROCHLORIDE 0.4 MG: 0.4 CAPSULE ORAL at 08:05

## 2024-01-29 RX ADMIN — PIOGLITAZONE HYDROCHLORIDE 15 MG: 15 TABLET ORAL at 13:51

## 2024-01-29 RX ADMIN — CEFTRIAXONE SODIUM 1 G: 1 INJECTION, POWDER, FOR SOLUTION INTRAMUSCULAR; INTRAVENOUS at 15:37

## 2024-01-29 RX ADMIN — ATORVASTATIN CALCIUM 10 MG: 10 TABLET, FILM COATED ORAL at 08:05

## 2024-01-29 RX ADMIN — SODIUM CHLORIDE, POTASSIUM CHLORIDE, SODIUM LACTATE AND CALCIUM CHLORIDE: 600; 310; 30; 20 INJECTION, SOLUTION INTRAVENOUS at 11:28

## 2024-01-29 RX ADMIN — LOSARTAN POTASSIUM 50 MG: 50 TABLET, FILM COATED ORAL at 08:05

## 2024-01-29 RX ADMIN — AMLODIPINE BESYLATE 10 MG: 10 TABLET ORAL at 08:05

## 2024-01-29 ASSESSMENT — ACTIVITIES OF DAILY LIVING (ADL)
ADLS_ACUITY_SCORE: 20

## 2024-01-29 NOTE — PLAN OF CARE
Problem: UTI (Urinary Tract Infection)  Goal: Improved Infection Symptoms  Outcome: Progressing     Problem: Adult Inpatient Plan of Care  Goal: Absence of Hospital-Acquired Illness or Injury  Intervention: Identify and Manage Fall Risk  Recent Flowsheet Documentation  Taken 1/29/2024 0000 by Devick, Karen M, RN  Safety Promotion/Fall Prevention:   clutter free environment maintained   assistive device/personal items within reach   nonskid shoes/slippers when out of bed   room near nurse's station   room organization consistent   safety round/check completed  Intervention: Prevent Skin Injury  Recent Flowsheet Documentation  Taken 1/29/2024 0000 by Devick, Karen M, RN  Body Position: position changed independently  Intervention: Prevent Infection  Recent Flowsheet Documentation  Taken 1/29/2024 0000 by Devick, Karen M, RN  Infection Prevention:   rest/sleep promoted   hand hygiene promoted     Goal Outcome Evaluation: Patient is alert and oriented. Up at bedside to use urinal independently. LR at 100 ml/hr. Denies pain. On room air. Frequent dry cough, patient believes this is due to his BP medication.  At 2215 Patient had a temp of 104.8 rectally. , /66. Tylenol given, ice packs placed and Provider was notified. At 2315 temp was 103.0 Orally. At 0015 temp was 100.3 orally and at 0200 temp was 97.8, heart rate 85.

## 2024-01-29 NOTE — PLAN OF CARE
"Goal Outcome Evaluation:      Plan of Care Reviewed With: patient    Overall Patient Progress: improvingOverall Patient Progress: improving    Problem: Adult Inpatient Plan of Care  Goal: Plan of Care Review  Description: The Plan of Care Review/Shift note should be completed every shift.  The Outcome Evaluation is a brief statement about your assessment that the patient is improving, declining, or no change.  This information will be displayed automatically on your shift  note.  1/29/2024 1206 by Katina Crowe RN  Outcome: Progressing  Flowsheets (Taken 1/29/2024 1206)  Plan of Care Reviewed With: patient  Overall Patient Progress: improving  1/29/2024 1018 by Katina Crowe RN  Outcome: Progressing  Flowsheets (Taken 1/29/2024 1018)  Outcome Evaluation: Patient has been afebrile today so far. Could be independent in his room, but needs nursing to maneuver IV pole. Voided 800 ml at one time-cloudy, melinda colored urine.  Plan of Care Reviewed With: patient  Overall Patient Progress: improving  Goal: Patient-Specific Goal (Individualized)  Description: You can add care plan individualizations to a care plan. Examples of Individualization might be:  \"Parent requests to be called daily at 9am for status\", \"I have a hard time hearing out of my right ear\", or \"Do not touch me to wake me up as it startles  me\".  1/29/2024 1206 by Katina Crowe RN  Outcome: Progressing  1/29/2024 1018 by Katina Crowe RN  Outcome: Progressing  Goal: Absence of Hospital-Acquired Illness or Injury  1/29/2024 1206 by Katina Crowe RN  Outcome: Progressing  1/29/2024 1018 by Katina Crowe RN  Outcome: Progressing  Intervention: Identify and Manage Fall Risk  Recent Flowsheet Documentation  Taken 1/29/2024 0800 by Katina Crowe RN  Safety Promotion/Fall Prevention:   clutter free environment maintained   nonskid shoes/slippers when out of bed   safety round/check completed   room organization consistent   room near nurse's " station  Intervention: Prevent Skin Injury  Recent Flowsheet Documentation  Taken 1/29/2024 0800 by Katina Crowe RN  Body Position: position changed independently  Intervention: Prevent Infection  Recent Flowsheet Documentation  Taken 1/29/2024 0800 by Katina Crowe RN  Infection Prevention:   rest/sleep promoted   hand hygiene promoted  Goal: Optimal Comfort and Wellbeing  1/29/2024 1206 by Katina Crowe RN  Outcome: Progressing  1/29/2024 1018 by Katina Crowe RN  Outcome: Progressing  Goal: Readiness for Transition of Care  1/29/2024 1206 by Katina Crowe RN  Outcome: Progressing  1/29/2024 1018 by Katina Crowe RN  Outcome: Progressing     Problem: UTI (Urinary Tract Infection)  Goal: Improved Infection Symptoms  1/29/2024 1206 by Katina Crowe RN  Outcome: Progressing  1/29/2024 1018 by Katina Crowe RN  Outcome: Progressing      Patient denies discomfort. Has been fatigued today. Noticed that patient voids large amounts at a time like 800 to 900 ml. Did encourage him to empty bladder more often as large amounts of urine in bladder may contribute to UTI. Agrees to do this. Temperature max 99.1 today. Alert and oriented. Could be independent in his room, but requires assist due to the IV pole.

## 2024-01-29 NOTE — PROGRESS NOTES
[facility]    Hospitalist Progress Note    Date of Service (when I saw the patient): 01/29/2024    Assessment & Plan   Taj Velazquez Sr. is a 73 year old male who presents on 1/28/2024 with fatigue, chills, and urinary frequency. He is being admitted under observation for treatment of UTI & urosepsis.      Urinary tract infection   Urosepsis    Presents with fatigue urinary frequency, chills. Septic based on febrile (101F) and tachycardic (130bpm).     Notably, patient recently finished cefuroxime treatment for pan-sensitive e. Coli UTI diagnosed 1/11/24. Poorly controlled diabetes may be making patient higher risk for recurrent infections.   -Ceftriaxone 2g IV Q24hrs  -Lactated ringer 100/hr continuous for gentle repletion  -Urine culture 1/28 growing E. coli, await sensitivities  -Blood cultures x2 1/28 growing gram-negative rods  -acetaminophen for mild pain or fever  -Hospitalized until febrile x 24 hours.     Benign essential hypertension  Mildly hypertensive, 's on admission. Managed PTA with losartan 50mg daily & amlodipine 10mg daily.   -Continue PTA amlodipine & losartan with hold parameters     Type 2 diabetes mellitus  Poorly controlled (hemoglobin A1c 1/28/24 is 10.0), with hyperglycemia on presentation (glucose 310). Managed PTA with metformin 1500mg daily (higher doses caused GI upset). Trulicity was added at last PCP visit 1/18/24, but patient says this was $400 per shot when he went to the pharmacy so he did not fill the prescription. Based on A1c, patient qualifies for home insulin management. He is apprehensive about frequent injections.   -Started insulin glargine 10units daily before breakfast on 1/29  -Medium sliding scale insulin during admission  -Carb counting by RN  -Hypoglycemia monitoring & treatment protocols in place  -Continue PTA metformin  -Care management consult to assess for possible coverage work-around for Trulicity  -Would likely benefit from  "outpatient diabetes educator referral  -Start pioglitazone 15 mg daily     Pseudohyponatremia  Very mild, Na 132 on admission, is 136 when corrected for hyperglycemia.   -Na 131 on 1/29, corrected is normal at 135     ASCVD risk management  No documented history of hyperlipidemia. Taking atorvastatin 10mg daily, continue.      BMI 34.0-34.9  Obesity contributes to overall increased morbidity and mortality.     Clinically Significant Risk Factors Present on Admission             # Hypertension: Noted on problem list     # DMII: A1C = 10.0 % (Ref range: <5.7 %) within past 6 months    # Obesity: Estimated body mass index is 32.78 kg/m  as calculated from the following:    Height as of this encounter: 1.803 m (5' 11\").    Weight as of this encounter: 106.6 kg (235 lb).           Diet:  moderate consistent carb  DVT Prophylaxis: Ambulate every shift  Aguero Catheter: Not present  Code Status:  DNR, DNI  Lines: PIV     Disposition: Expected discharge in 1-2 days once afebrile for 24 hours.    25 MINUTES SPENT BY ME on the date of service doing chart review, history, exam, documentation & further activities per the note.    Panchito Robles MD    Interval History   Patient continues to feel malaise and lethargy, but is improved.  Appetite is good.  Febrile yesterday afternoon to 39.4.    -Data reviewed today: I reviewed all new labs and imaging results over the last 24 hours. I personally reviewed no images or EKG's today.    Physical Exam   Temp: 99.1  F (37.3  C) Temp src: Oral BP: 124/59 Pulse: 80   Resp: 18 SpO2: 95 % O2 Device: None (Room air)    Vitals:    01/28/24 1147   Weight: 106.6 kg (235 lb)     Vital Signs with Ranges  Temp:  [97.7  F (36.5  C)-104.8  F (40.4  C)] 99.1  F (37.3  C)  Pulse:  [] 80  Resp:  [10-23] 18  BP: (110-137)/(57-78) 124/59  SpO2:  [93 %-97 %] 95 %  I/O last 3 completed shifts:  In: -   Out: 1100 [Urine:1100]    Gen: Well nourished, well developed, alert and oriented x 3, no acute " distressed  HEENT: Atraumatic, normocephalic; sclera non-injected, anicterric; oral mucosa moist, no lesion, no exudate  Lungs: Clear to ausculation, no wheezes, no rhonchi, no rales  Heart: Regular rate, regular rhythm, no gallops, no rubs, no murmurs  GI: Bowel sound normal, no hepatosplenomegaly, no masses, non-tender, non-distended, no guarding, no rebound tenderness  Lymph: No lymphadenopathy, no edema  Skin: No rashes, no chronic venous stasis     Medications    lactated ringers 100 mL/hr at 01/29/24 1128      amLODIPine  10 mg Oral Daily    atorvastatin  10 mg Oral Daily    cefTRIAXone  2 g Intravenous Q24H    insulin aspart  1-7 Units Subcutaneous TID AC    insulin aspart  1-5 Units Subcutaneous At Bedtime    insulin glargine  10 Units Subcutaneous QAM AC    losartan  50 mg Oral Daily    metFORMIN  1,500 mg Oral Daily with breakfast    sodium chloride (PF)  3 mL Intracatheter Q8H    tamsulosin  0.4 mg Oral Daily       Data   Recent Labs   Lab 01/29/24  1100 01/29/24  0757 01/29/24  0507 01/28/24  1650 01/28/24  1213 01/28/24  1205   0000   WBC  --   --  7.0  --   --  10.1  --    HGB  --   --  11.7*  --   --  13.8  --    MCV  --   --  85  --   --  83  --    PLT  --   --  171  --   --  207  --    NA  --   --  131*  --  132*  --   --    POTASSIUM  --   --  4.1  --  4.3  --   --    CHLORIDE  --   --  95*  --  98  --   --    CO2  --   --  23  --  20*  --   --    BUN  --   --  19.6  --  15.8  --   --    CR  --   --  0.89  --  0.75  --   --    ANIONGAP  --   --  13  --  14  --   --    AUGUSTIN  --   --  8.7*  --  9.5  --   --    * 247* 255*   < > 310*  --    < >   ALBUMIN  --   --   --   --  4.0  --   --    PROTTOTAL  --   --   --   --  7.4  --   --    BILITOTAL  --   --   --   --  1.1  --   --    ALKPHOS  --   --   --   --  76  --   --    ALT  --   --   --   --  37  --   --    AST  --   --   --   --  23  --   --     < > = values in this interval not displayed.       No results found for this or any previous  visit (from the past 24 hour(s)).

## 2024-01-29 NOTE — CONSULTS
"Care Management Note:     Care Management team received referral to assist with cost of medications & compliance.     Per IDT rounds, SW informed MD and Pharmacy team that pt was not taking his,medications due to cost.  Per H&P, \"Trulicity was added at last PCP visit 1/18/24, but patient says this was $400 per shot when he went to the pharmacy so he did not fill the prescription. Based on A1c, patient qualifies for home insulin management. He is apprehensive about frequent injections.\"  MD stated he will speak to pt about cost affordable medication with the pt.    SW met with pt to complete the MOON form and to discuss above.  Pt stated he was super excited to do a 1x/week injection, but when he learned of the cost associated with the medication, he stated he could not afford a $1200/month medication.  SW & pt discussed that hospital MD will discussed a different medication with the pt today, if pt prefers; pt agreeable and prefers this as again, stating that paying $1200/month of a medication is not in his budget.    BAY also educated that [facility] has a DM Educator, should pt wish to schedule, but MD will most likely make referral for DM Education post hospital follow up.    Pt declines any discharge needs at this time, so will close referral at this time, but please place new consult should pt develop new needs during this stay.        Jada Forbes, [facility]  Care Transitions   Tele: [phone]   "

## 2024-01-30 VITALS
WEIGHT: 235 LBS | BODY MASS INDEX: 32.9 KG/M2 | OXYGEN SATURATION: 95 % | DIASTOLIC BLOOD PRESSURE: 75 MMHG | HEIGHT: 71 IN | RESPIRATION RATE: 18 BRPM | HEART RATE: 75 BPM | TEMPERATURE: 98.1 F | SYSTOLIC BLOOD PRESSURE: 139 MMHG

## 2024-01-30 LAB
ANION GAP SERPL CALCULATED.3IONS-SCNC: 14 MMOL/L (ref 7–15)
BASOPHILS # BLD AUTO: 0 10E3/UL (ref 0–0.2)
BASOPHILS NFR BLD AUTO: 1 %
BUN SERPL-MCNC: 11.9 MG/DL (ref 8–23)
CALCIUM SERPL-MCNC: 8.7 MG/DL (ref 8.8–10.2)
CHLORIDE SERPL-SCNC: 99 MMOL/L (ref 98–107)
CREAT SERPL-MCNC: 0.68 MG/DL (ref 0.67–1.17)
DEPRECATED HCO3 PLAS-SCNC: 22 MMOL/L (ref 22–29)
EGFRCR SERPLBLD CKD-EPI 2021: >90 ML/MIN/1.73M2
ENTEROCOCCUS FAECALIS: NOT DETECTED
ENTEROCOCCUS FAECIUM: NOT DETECTED
EOSINOPHIL # BLD AUTO: 0.1 10E3/UL (ref 0–0.7)
EOSINOPHIL NFR BLD AUTO: 1 %
ERYTHROCYTE [DISTWIDTH] IN BLOOD BY AUTOMATED COUNT: 13.1 % (ref 10–15)
GLUCOSE BLDC GLUCOMTR-MCNC: 214 MG/DL (ref 70–99)
GLUCOSE BLDC GLUCOMTR-MCNC: 220 MG/DL (ref 70–99)
GLUCOSE BLDC GLUCOMTR-MCNC: 223 MG/DL (ref 70–99)
GLUCOSE SERPL-MCNC: 226 MG/DL (ref 70–99)
HCT VFR BLD AUTO: 34.2 % (ref 40–53)
HGB BLD-MCNC: 11.9 G/DL (ref 13.3–17.7)
IMM GRANULOCYTES # BLD: 0 10E3/UL
IMM GRANULOCYTES NFR BLD: 0 %
LISTERIA SPECIES (DETECTED/NOT DETECTED): NOT DETECTED
LYMPHOCYTES # BLD AUTO: 0.9 10E3/UL (ref 0.8–5.3)
LYMPHOCYTES NFR BLD AUTO: 23 %
MCH RBC QN AUTO: 29.3 PG (ref 26.5–33)
MCHC RBC AUTO-ENTMCNC: 34.8 G/DL (ref 31.5–36.5)
MCV RBC AUTO: 84 FL (ref 78–100)
MONOCYTES # BLD AUTO: 0.7 10E3/UL (ref 0–1.3)
MONOCYTES NFR BLD AUTO: 19 %
NEUTROPHILS # BLD AUTO: 2.1 10E3/UL (ref 1.6–8.3)
NEUTROPHILS NFR BLD AUTO: 56 %
NRBC # BLD AUTO: 0 10E3/UL
NRBC BLD AUTO-RTO: 0 /100
PLATELET # BLD AUTO: 152 10E3/UL (ref 150–450)
POTASSIUM SERPL-SCNC: 4.1 MMOL/L (ref 3.4–5.3)
RBC # BLD AUTO: 4.06 10E6/UL (ref 4.4–5.9)
SODIUM SERPL-SCNC: 135 MMOL/L (ref 135–145)
STAPHYLOCOCCUS AUREUS: NOT DETECTED
STAPHYLOCOCCUS EPIDERMIDIS: NOT DETECTED
STAPHYLOCOCCUS LUGDUNENSIS: NOT DETECTED
STAPHYLOCOCCUS SPECIES: NOT DETECTED
STREPTOCOCCUS AGALACTIAE: NOT DETECTED
STREPTOCOCCUS ANGINOSUS GROUP: NOT DETECTED
STREPTOCOCCUS PNEUMONIAE: NOT DETECTED
STREPTOCOCCUS PYOGENES: NOT DETECTED
STREPTOCOCCUS SPECIES: NOT DETECTED
WBC # BLD AUTO: 3.8 10E3/UL (ref 4–11)

## 2024-01-30 PROCEDURE — 87040 BLOOD CULTURE FOR BACTERIA: CPT | Performed by: INTERNAL MEDICINE

## 2024-01-30 PROCEDURE — 250N000013 HC RX MED GY IP 250 OP 250 PS 637

## 2024-01-30 PROCEDURE — 85025 COMPLETE CBC W/AUTO DIFF WBC: CPT | Performed by: INTERNAL MEDICINE

## 2024-01-30 PROCEDURE — 99239 HOSP IP/OBS DSCHRG MGMT >30: CPT | Performed by: INTERNAL MEDICINE

## 2024-01-30 PROCEDURE — 250N000013 HC RX MED GY IP 250 OP 250 PS 637: Performed by: STUDENT IN AN ORGANIZED HEALTH CARE EDUCATION/TRAINING PROGRAM

## 2024-01-30 PROCEDURE — 36415 COLL VENOUS BLD VENIPUNCTURE: CPT | Performed by: INTERNAL MEDICINE

## 2024-01-30 PROCEDURE — 80048 BASIC METABOLIC PNL TOTAL CA: CPT | Performed by: INTERNAL MEDICINE

## 2024-01-30 PROCEDURE — 258N000003 HC RX IP 258 OP 636: Performed by: STUDENT IN AN ORGANIZED HEALTH CARE EDUCATION/TRAINING PROGRAM

## 2024-01-30 PROCEDURE — 250N000013 HC RX MED GY IP 250 OP 250 PS 637: Performed by: INTERNAL MEDICINE

## 2024-01-30 RX ORDER — CIPROFLOXACIN 500 MG/1
500 TABLET, FILM COATED ORAL 2 TIMES DAILY
Qty: 10 TABLET | Refills: 0 | Status: SHIPPED | OUTPATIENT
Start: 2024-01-30 | End: 2024-02-04

## 2024-01-30 RX ORDER — PIOGLITAZONEHYDROCHLORIDE 15 MG/1
15 TABLET ORAL DAILY
Qty: 30 TABLET | Refills: 0 | Status: SHIPPED | OUTPATIENT
Start: 2024-01-31 | End: 2024-03-07

## 2024-01-30 RX ADMIN — SODIUM CHLORIDE, POTASSIUM CHLORIDE, SODIUM LACTATE AND CALCIUM CHLORIDE: 600; 310; 30; 20 INJECTION, SOLUTION INTRAVENOUS at 09:57

## 2024-01-30 RX ADMIN — ATORVASTATIN CALCIUM 10 MG: 10 TABLET, FILM COATED ORAL at 08:02

## 2024-01-30 RX ADMIN — TAMSULOSIN HYDROCHLORIDE 0.4 MG: 0.4 CAPSULE ORAL at 08:02

## 2024-01-30 RX ADMIN — SENNOSIDES AND DOCUSATE SODIUM 1 TABLET: 8.6; 5 TABLET ORAL at 08:11

## 2024-01-30 RX ADMIN — SODIUM CHLORIDE, POTASSIUM CHLORIDE, SODIUM LACTATE AND CALCIUM CHLORIDE: 600; 310; 30; 20 INJECTION, SOLUTION INTRAVENOUS at 00:32

## 2024-01-30 RX ADMIN — LOSARTAN POTASSIUM 50 MG: 50 TABLET, FILM COATED ORAL at 08:02

## 2024-01-30 RX ADMIN — AMLODIPINE BESYLATE 10 MG: 10 TABLET ORAL at 08:02

## 2024-01-30 RX ADMIN — METFORMIN HYDROCHLORIDE 1500 MG: 750 TABLET, EXTENDED RELEASE ORAL at 08:02

## 2024-01-30 RX ADMIN — PIOGLITAZONE HYDROCHLORIDE 15 MG: 15 TABLET ORAL at 08:02

## 2024-01-30 ASSESSMENT — ACTIVITIES OF DAILY LIVING (ADL)
ADLS_ACUITY_SCORE: 20

## 2024-01-30 NOTE — PLAN OF CARE
Problem: UTI (Urinary Tract Infection)  Goal: Improved Infection Symptoms  Outcome: Progressing   Goal Outcome Evaluation: Pt voiding good amounts in urinal. Denies pain or burning w/ urination.

## 2024-01-30 NOTE — PLAN OF CARE
"  Problem: Adult Inpatient Plan of Care  Goal: Plan of Care Review  Description: The Plan of Care Review/Shift note should be completed every shift.  The Outcome Evaluation is a brief statement about your assessment that the patient is improving, declining, or no change.  This information will be displayed automatically on your shift  note.  Outcome: Progressing  Flowsheets (Taken 1/30/2024 0927)  Outcome Evaluation: Patient up independently in room. Low grade temp during the night and afebrile this am. Denies discomfort. Did talk to him yesterday about trying to empty his bladder more often. Voids large amounts at a time 700-900 ml. Good appetite and taking fluids well. Is hoping to be able to go home today, but also said he doesn't want to leave too early either. Awaiting MD to round. Pleasant and cooperative with cares.  Plan of Care Reviewed With: patient  Overall Patient Progress: improving  Goal: Patient-Specific Goal (Individualized)  Description: You can add care plan individualizations to a care plan. Examples of Individualization might be:  \"Parent requests to be called daily at 9am for status\", \"I have a hard time hearing out of my right ear\", or \"Do not touch me to wake me up as it startles  me\".  Outcome: Progressing  Goal: Absence of Hospital-Acquired Illness or Injury  Outcome: Progressing  Intervention: Identify and Manage Fall Risk  Recent Flowsheet Documentation  Taken 1/30/2024 0800 by Katina Crowe RN  Safety Promotion/Fall Prevention:   nonskid shoes/slippers when out of bed   clutter free environment maintained  Intervention: Prevent Skin Injury  Recent Flowsheet Documentation  Taken 1/30/2024 0800 by Katina Crowe RN  Body Position: position changed independently  Intervention: Prevent Infection  Recent Flowsheet Documentation  Taken 1/30/2024 0800 by Katina Crowe RN  Infection Prevention:   hand hygiene promoted   rest/sleep promoted  Goal: Optimal Comfort and Wellbeing  Outcome: " Progressing  Goal: Readiness for Transition of Care  Outcome: Progressing     Problem: UTI (Urinary Tract Infection)  Goal: Improved Infection Symptoms  Outcome: Progressing     Problem: Risk for Delirium  Goal: Optimal Coping  Outcome: Progressing  Goal: Improved Behavioral Control  Outcome: Progressing  Goal: Improved Attention and Thought Clarity  Outcome: Progressing  Goal: Improved Sleep  Outcome: Progressing   Goal Outcome Evaluation:      Plan of Care Reviewed With: patient    Overall Patient Progress: improvingOverall Patient Progress: improving    Outcome Evaluation: Patient up independently in room. Low grade temp during the night and afebrile this am. Denies discomfort. Did talk to him yesterday about trying to empty his bladder more often. Voids large amounts at a time 700-900 ml. Good appetite and taking fluids well. Is hoping to be able to go home today, but also said he doesn't want to leave too early either. Awaiting MD to round. Pleasant and cooperative with cares.

## 2024-01-30 NOTE — DISCHARGE SUMMARY
[facility]  Hospitalist Discharge Summary       Date of Admission:  1/28/2024  Date of Discharge:  1/30/2024  1:05 PM  Discharging Provider: Panchito Robles MD      Discharge Diagnoses     Urinary tract infection   Urosepsis  Benign essential hypertension  Type 2 diabetes mellitus  Pseudohyponatremia  ASCVD risk management  BMI 34.0-34.9  Hypertension  DMII  Obesity    Follow-ups Needed After Discharge   Follow-up Appointments     Follow-up and recommended labs and tests       Follow up with primary care provider, Steven Duane Semmler, within 7 days   for hospital follow- up.  The following labs/tests are recommended: CBC   and CMP.          Unresulted Labs Ordered in the Past 30 Days of this Admission       Date and Time Order Name Status Description    1/30/2024 12:01 AM Blood Culture Arm, Left In process     1/28/2024 12:21 PM Blood Culture Peripheral Blood Preliminary     1/28/2024 12:14 PM Blood Culture Peripheral Blood Preliminary         These results will be followed up by Panchito Robles or PCP    Discharge Disposition   Discharged to home  Condition at discharge: Stable    Hospital Course   Taj Velazquez Sr. is a 73 year old male who presents on 1/28/2024 with fatigue, chills, and urinary frequency. He is being admitted under observation for treatment of UTI & urosepsis.      Urinary tract infection   Urosepsis    Presents with fatigue urinary frequency, chills. Septic based on febrile (101F) and tachycardic (130bpm).     Notably, patient recently finished cefuroxime treatment for pan-sensitive e. Coli UTI diagnosed 1/11/24. Poorly controlled diabetes may be making patient higher risk for recurrent infections.   -Ceftriaxone 2g IV Q24hrs  -Lactated ringer 100/hr continuous for gentle repletion  -Urine culture 1/28 growing E. coli, await sensitivities  -Blood cultures x2 1/28 growing E. coli  -acetaminophen for mild pain or fever  -Hospitalized until febrile x 24  "hours.  -Discharged on ciprofloxacin home to complete 7-day course of antibiotics     Benign essential hypertension  Mildly hypertensive, 's on admission. Managed PTA with losartan 50mg daily & amlodipine 10mg daily.   -Continue PTA amlodipine & losartan with hold parameters     Type 2 diabetes mellitus  Poorly controlled (hemoglobin A1c 1/28/24 is 10.0), with hyperglycemia on presentation (glucose 310). Managed PTA with metformin 1500mg daily (higher doses caused GI upset). Trulicity was added at last PCP visit 1/18/24, but patient says this was $400 per shot when he went to the pharmacy so he did not fill the prescription. Based on A1c, patient qualifies for home insulin management. He is apprehensive about frequent injections.   -Started insulin glargine 10units daily before breakfast on 1/29  -Medium sliding scale insulin during admission  -Carb counting by RN  -Hypoglycemia monitoring & treatment protocols in place  -Continue PTA metformin  -Care management consult to assess for possible coverage work-around for Trulicity  -Would likely benefit from outpatient diabetes educator referral  -Start pioglitazone 15 mg daily  -Increase metformin to 1000 mg twice daily     Pseudohyponatremia  Very mild, Na 132 on admission, is 136 when corrected for hyperglycemia.   -Na 131 on 1/29, corrected is normal at 135  -Sodium 135 at discharge     ASCVD risk management  No documented history of hyperlipidemia. Taking atorvastatin 10mg daily, continue.      BMI 34.0-34.9  Obesity contributes to overall increased morbidity and mortality.     Clinically Significant Risk Factors Present on Admission             # Hypertension: Noted on problem list     # DMII: A1C = 10.0 % (Ref range: <5.7 %) within past 6 months    # Obesity: Estimated body mass index is 32.78 kg/m  as calculated from the following:    Height as of this encounter: 1.803 m (5' 11\").    Weight as of this encounter: 106.6 kg (235 lb).           Consultations " This Hospital Stay   CARE MANAGEMENT / SOCIAL WORK IP CONSULT    Code Status   Full Code    35 MINUTES SPENT BY ME on the date of service doing chart review, history, exam, documentation & further activities per the note.         Panchito Robles MD  [facility]  ______________________________________________________________________    Physical Exam   Vital Signs: Temp: 98.1  F (36.7  C) Temp src: Oral BP: 139/75 Pulse: 75   Resp: 18 SpO2: 95 % O2 Device: None (Room air)    Weight: 235 lbs 0 oz       Gen: Well nourished, well developed, alert and oriented x 3, no acute distressed  HEENT: Atraumatic, normocephalic; sclera non-injected, anicterric; oral mucosa moist, no lesion, no exudate  Lungs: Clear to ausculation, no wheezes, no rhonchi, no rales  Heart: Regular rate, regular rhythm, no gallops, no rubs, no murmurs  GI: Bowel sound normal, no hepatosplenomegaly, no masses, non-tender, non-distended, no guarding, no rebound tenderness  Lymph: No lymphadenopathy, no edema  Skin: No rashes, no chronic venous stasis     Primary Care Physician   Steven Duane Semmler    Discharge Orders      Adult Urology [facility] Referral      Reason for your hospital stay    This is a 73-year-old male admitted with sepsis due to urinary tract infection.     Follow-up and recommended labs and tests     Follow up with primary care provider, Steven Duane Semmler, within 7 days for hospital follow- up.  The following labs/tests are recommended: CBC and CMP.     Activity    Your activity upon discharge: activity as tolerated     Monitor and record    blood glucose 4 times a day, before meals and at bedtime     Full Code     Diet    Follow this diet upon discharge: Orders Placed This Encounter      Moderate Consistent Carb (60 g CHO per Meal) Diet         Significant Results and Procedures   Most Recent 3 CBC's:  Recent Labs   Lab Test 01/30/24  0501 01/29/24  0507 01/28/24  1205   WBC 3.8* 7.0 10.1   HGB  11.9* 11.7* 13.8   MCV 84 85 83    171 207     Most Recent 3 BMP's:  Recent Labs   Lab Test 01/30/24  1206 01/30/24  0750 01/30/24  0501 01/29/24  0757 01/29/24  0507 01/28/24  1650 01/28/24  1213   NA  --   --  135  --  131*  --  132*   POTASSIUM  --   --  4.1  --  4.1  --  4.3   CHLORIDE  --   --  99  --  95*  --  98   CO2  --   --  22  --  23  --  20*   BUN  --   --  11.9  --  19.6  --  15.8   CR  --   --  0.68  --  0.89  --  0.75   ANIONGAP  --   --  14  --  13  --  14   AUGUSTIN  --   --  8.7*  --  8.7*  --  9.5   * 214* 226*   < > 255*   < > 310*    < > = values in this interval not displayed.     7-Day Micro Results       Collected Updated Procedure Result Status      01/30/2024 0501 01/30/2024 0540 Blood Culture Arm, Left [[id number]]   Blood from Arm, Left    In process Component Value   No component results               01/28/2024 1310 01/30/2024 1053 Blood Culture Peripheral Blood [[id number]]   (Abnormal)   Peripheral Blood    Preliminary result Component Value   Culture Positive on the 1st day of incubation  [P]     Escherichia coli  [P]     2 of 2 bottles  Susceptibilities done on previous cultures               01/28/2024 1309 01/29/2024 2135 Urine Culture [[id number]]    (Abnormal)   Urine, Clean Catch    Final result Component Value   Culture >100,000 CFU/mL Escherichia coli        Susceptibility        Escherichia coli      SAEID      Ampicillin >=32 ug/mL Resistant      Ampicillin/ Sulbactam 16 ug/mL Intermediate      Cefazolin <=4 ug/mL Susceptible  [1]       Cefepime <=1 ug/mL Susceptible      Cefoxitin <=4 ug/mL Susceptible      Ceftazidime <=1 ug/mL Susceptible      Ceftriaxone <=1 ug/mL Susceptible      Ciprofloxacin <=0.25 ug/mL Susceptible      Gentamicin >=16 ug/mL Resistant      Levofloxacin <=0.12 ug/mL Susceptible      Nitrofurantoin <=16 ug/mL Susceptible      Piperacillin/Tazobactam <=4 ug/mL Susceptible      Tobramycin 4 ug/mL Susceptible       Trimethoprim/Sulfamethoxazole >16/304 ug/mL Resistant                   [1]  Cefazolin SAEID breakpoints are for the treatment of uncomplicated urinary tract infections. For the treatment of systemic infections, please contact the laboratory for additional testing.                   01/28/2024 1220 01/30/2024 1117 Blood Culture Peripheral Blood [[id number]]    (Abnormal)   Peripheral Blood    Preliminary result Component Value   Culture Positive on the 1st day of incubation  [P]     Escherichia coli  [P]     1 of 2 bottles    Gram positive cocci  [P]     1 of 2 bottles        Susceptibility        Escherichia coli      SAEID      Ampicillin >=32 ug/mL Resistant      Ampicillin/ Sulbactam 16 ug/mL Intermediate      Cefepime <=1 ug/mL Susceptible      Ceftazidime <=1 ug/mL Susceptible      Ceftriaxone <=1 ug/mL Susceptible      Ciprofloxacin <=0.25 ug/mL Susceptible      Gentamicin >=16 ug/mL Resistant      Levofloxacin <=0.12 ug/mL Susceptible      Meropenem <=0.25 ug/mL Susceptible      Piperacillin/Tazobactam <=4 ug/mL Susceptible      Tobramycin 8 ug/mL Intermediate      Trimethoprim/Sulfamethoxazole >16/304 ug/mL Resistant                           01/28/2024 1220 01/29/2024 0755 Verigene GN Panel [[id number]]    (Abnormal)   Peripheral Blood    Final result Component Value   Acinetobacter species Not Detected   Citrobacter species Not Detected   Enterobacter species Not Detected   Proteus species Not Detected   Escherichia coli Detected   Positive for Escherichia coli by Verigene multiplex nucleic acid test. Final identification and antimicrobial susceptibility testing will be verified by standard methods. Verigene test will not distinguish E. coli from Shigella species including Shigella dysenteriae, Shigella flexneri, Shigella boydii, and Shigella sonnei. Specimens containing Shigella species or E. coli will be reported as positive for E. coli.   Klebsiella pneumoniae Not Detected   Klebsiella oxytoca Not  Detected   Pseudomonas aeruginosa Not Detected   CTX-M Not Detected   KPC Not Detected   NDM Not Detected   VIM Not Detected   IMP Not Detected   OXA Not Detected            01/28/2024 1220 01/30/2024 1351 Verigene GP Panel [[id number]]    Peripheral Blood    Final result Component Value   Staphylococcus species Not Detected   Staphylococcus aureus Not Detected   Staphylococcus epidermidis Not Detected   Staphylococcus lugdunensis Not Detected   Enterococcus faecalis Not Detected   Enterococcus faecium Not Detected   Streptococcus species Not Detected   Streptococcus agalactiae Not Detected   Streptococcus anginosus group Not Detected   Streptococcus pneumoniae Not Detected   Streptococcus pyogenes Not Detected   Listeria species Not Detected            01/28/2024 1206 01/28/2024 1251 Symptomatic Influenza A/B, RSV, & SARS-CoV2 PCR (COVID-19) Nasopharyngeal [[id number]]    Swab from Nasopharyngeal    Final result Component Value   Influenza A PCR Negative   Influenza B PCR Negative   RSV PCR Negative   SARS CoV2 PCR Negative   NEGATIVE: SARS-CoV-2 (COVID-19) RNA not detected, presumed negative.                  Most Recent Urinalysis:  Recent Labs   Lab Test 01/28/24  1309 01/11/24  1529 11/28/18  1019   COLOR Yellow   < > Yellow   APPEARANCE Cloudy*   < > Clear   URINEGLC >499*   < > 150 mg/dL*   URINEBILI Negative   < > Negative   URINEKETONE Negative   < > Negative   SG 1.021   < > 1.015   UBLD Small*   < > Negative   URINEPH 6.0   < > 6.0   PROTEIN 30*   < > Negative   UROBILINOGEN  --   --  2.0 E.U./dL   NITRITE Positive*   < > Negative   LEUKEST Moderate*   < > Negative   RBCU 12*   < >  --    WBCU >182*   < >  --     < > = values in this interval not displayed.   ,   Results for orders placed or performed during the hospital encounter of 01/11/24   Chest XR,  PA & LAT    Narrative    EXAM: XR CHEST 2 VIEWS  LOCATION: [facility]  DATE: 1/11/2024    INDICATION: Cough.  History of  diabetes.  Eval for pneumonia versus other acute cardiopulmonary process  COMPARISON: None.      Impression    IMPRESSION:     Lungs are clear. No evidence of pneumonia. No pleural effusions or pneumothorax. Normal pulmonary vascularity. Nonenlarged cardiac silhouette.   Head CT w/o contrast    Narrative    EXAM: CT HEAD W/O CONTRAST  LOCATION: [facility]  DATE: 1/11/2024    INDICATION: Gait instability, urinary frequency and incontinence x 5 days.  New thrombocytopenia.  Evaluate for acute intracranial process  COMPARISON: None.  TECHNIQUE: Routine CT Head without IV contrast. Multiplanar reformats. Dose reduction techniques were used.    FINDINGS:  INTRACRANIAL CONTENTS: No intracranial hemorrhage, extraaxial collection, or mass effect.  No CT evidence of acute infarct. Normal parenchymal attenuation. Normal ventricles and sulci.     VISUALIZED ORBITS/SINUSES/MASTOIDS: No intraorbital abnormality. Mild mucosal thickening scattered about the paranasal sinuses. No middle ear or mastoid effusion.    BONES/SOFT TISSUES: No acute abnormality.      Impression    IMPRESSION:  1.  No acute intracranial process.   Abd/pelvis CT - no contrast - Stone Protocol    Narrative    EXAM: CT ABDOMEN PELVIS W/O CONTRAST  LOCATION: [facility]  DATE: 1/11/2024    INDICATION: Urinary incontinence, hematuria, and UTI. Evaluate for obstructing stone vs other acute  process.  COMPARISON: None available.  TECHNIQUE: CT scan of the abdomen and pelvis was performed without IV contrast. Multiplanar reformats were obtained. Dose reduction techniques were used.  CONTRAST: None.    FINDINGS:   LOWER CHEST: Minimal bibasilar scarring/fibrosis. No airspace consolidation or pleural effusion.    HEPATOBILIARY: Diffuse hepatic steatosis. No evidence of biliary obstruction.    PANCREAS: Normal.    SPLEEN: Normal.    ADRENAL GLANDS: Normal.    KIDNEYS/BLADDER: Simple-appearing left renal cyst,  no specific follow-up recommended. No nephroureterolithiasis or hydronephrosis. Urinary bladder demonstrates diffuse wall thickening with surrounding fat stranding.    BOWEL: No obstruction or inflammatory change. Normal appendix.    LYMPH NODES: No suspicious lymphadenopathy.    VASCULATURE: Scattered calcified atherosclerosis.    PELVIC ORGANS: Prostatomegaly.    MUSCULOSKELETAL: No acute bony abnormality.      Impression    IMPRESSION:   1.  Findings consistent with cystitis.  2.  No nephroureterolithiasis or hydronephrosis.  3.  Prostatomegaly.  4.  Hepatic steatosis.         Discharge Medications   Current Discharge Medication List        START taking these medications    Details   ciprofloxacin (CIPRO) 500 MG tablet Take 1 tablet (500 mg) by mouth 2 times daily for 5 days  Qty: 10 tablet, Refills: 0    Associated Diagnoses: Urinary tract infection without hematuria, site unspecified      metFORMIN (GLUCOPHAGE) 1000 MG tablet Take 1 tablet (1,000 mg) by mouth 2 times daily (with meals) for 30 days  Qty: 60 tablet, Refills: 0    Associated Diagnoses: Urinary tract infection without hematuria, site unspecified      pioglitazone (ACTOS) 15 MG tablet Take 1 tablet (15 mg) by mouth daily for 30 days  Qty: 30 tablet, Refills: 0    Associated Diagnoses: Type 2 diabetes mellitus without complication, without long-term current use of insulin (H)           CONTINUE these medications which have NOT CHANGED    Details   amLODIPine (NORVASC) 10 MG tablet Take 1 tablet by mouth daily      atorvastatin (LIPITOR) 10 MG tablet Take 10 mg by mouth daily      blood glucose monitoring (SOFTCLIX) lancets 1 each daily      fluticasone (FLONASE) 50 MCG/ACT nasal spray Spray 1 spray into both nostrils daily as needed for rhinitis      YANIRA BLOOD GLUCOSE TEST test strip 1 strip by In Vitro route daily      losartan (COZAAR) 50 MG tablet Take 1 tablet by mouth daily      tamsulosin (FLOMAX) 0.4 MG capsule Take 0.4 mg by mouth daily            STOP taking these medications       metFORMIN (GLUCOPHAGE XR) 500 MG 24 hr tablet Comments:   Reason for Stopping:             Allergies   Allergies   Allergen Reactions    Lisinopril Cough    Nkda [No Known Drug Allergy]

## 2024-01-30 NOTE — PROGRESS NOTES
[facility] DISCHARGE NOTE    Patient discharged to home at 1:19 PM via ambulation. Accompanied by spouse and staff. Discharge instructions reviewed with patient, opportunity offered to ask questions. Prescriptions sent to patients preferred pharmacy. All belongings sent with patient.    Katina Crowe RN

## 2024-01-31 ENCOUNTER — PATIENT OUTREACH (OUTPATIENT)
Dept: CARE COORDINATION | Facility: CLINIC | Age: 74
End: 2024-01-31
Payer: COMMERCIAL

## 2024-01-31 NOTE — PROGRESS NOTES
Clinic Care Coordination Contact  [facility]: Post-Discharge Note  SITUATION                                                      Admission:    Admission Date: 01/28/24   Reason for Admission: Urinary tract infection   Urosepsis  Benign essential hypertension  Type 2 diabetes mellitus  Pseudohyponatremia  ASCVD risk management  BMI 34.0-34.9  Hypertension  DMII  Obesity  Discharge:   Discharge Date: 01/30/24  Discharge Diagnosis: Urinary tract infection   Urosepsis  Benign essential hypertension  Type 2 diabetes mellitus  Pseudohyponatremia  ASCVD risk management  BMI 34.0-34.9  Hypertension  DMII  Obesity    BACKGROUND                                                      Per hospital discharge summary and inpatient provider notes:    Taj Velazquez Sr. is a 73 year old male who presents on 1/28/2024 with fatigue, chills, and urinary frequency. He is being admitted under observation for treatment of UTI & urosepsis.      Urinary tract infection   Urosepsis    ASSESSMENT           Discharge Assessment  How are you doing now that you are home?: doing well  How are your symptoms? (Red Flag symptoms escalate to triage hotline per guidelines): Improved  Do you feel your condition is stable enough to be safe at home until your provider visit?: Yes  Does the patient have their discharge instructions? : Yes  Does the patient have questions regarding their discharge instructions? : No  Were you started on any new medications or were there changes to any of your previous medications? : Yes  Does the patient have all of their medications?: Yes  Do you have questions regarding any of your medications? : No  Do you have all of your needed medical supplies or equipment (DME)?  (i.e. oxygen tank, CPAP, cane, etc.): Yes  Discharge follow-up appointment scheduled within 14 calendar days? : No  Is patient agreeable to assistance with scheduling? : No    Post-op ([facility] Only)  If the patient had a surgery or procedure, do  they have any questions for a nurse?: No         PLAN                                                      Outpatient Plan:      No future appointments.    Follow-ups Needed After Discharge  Follow-up Appointments     Follow-up and recommended labs and tests       Follow up with primary care provider, Steven Duane Semmler, within 7 days   for hospital follow- up.  The following labs/tests are recommended: CBC   and CMP.       For any urgent concerns, please contact our 24 hour nurse triage line: [phone]     DINESH Guevara  [phone]  [facility]

## 2024-02-02 LAB
BACTERIA BLD CULT: ABNORMAL

## 2024-02-03 LAB
BACTERIA BLD CULT: ABNORMAL
BACTERIA BLD CULT: ABNORMAL

## 2024-02-04 LAB — BACTERIA BLD CULT: NO GROWTH

## 2024-03-07 ENCOUNTER — OFFICE VISIT (OUTPATIENT)
Dept: UROLOGY | Facility: CLINIC | Age: 74
End: 2024-03-07
Attending: INTERNAL MEDICINE
Payer: COMMERCIAL

## 2024-03-07 VITALS
WEIGHT: 224 LBS | OXYGEN SATURATION: 97 % | BODY MASS INDEX: 31.36 KG/M2 | DIASTOLIC BLOOD PRESSURE: 79 MMHG | SYSTOLIC BLOOD PRESSURE: 143 MMHG | HEIGHT: 71 IN | TEMPERATURE: 96.9 F | HEART RATE: 70 BPM

## 2024-03-07 DIAGNOSIS — N39.0 URINARY TRACT INFECTION WITHOUT HEMATURIA, SITE UNSPECIFIED: ICD-10-CM

## 2024-03-07 DIAGNOSIS — Z12.5 SCREENING FOR PROSTATE CANCER: Primary | ICD-10-CM

## 2024-03-07 LAB
ALBUMIN UR-MCNC: NEGATIVE MG/DL
APPEARANCE UR: ABNORMAL
BACTERIA #/AREA URNS HPF: ABNORMAL /HPF
BILIRUB UR QL STRIP: NEGATIVE
COLOR UR AUTO: YELLOW
GLUCOSE UR STRIP-MCNC: NEGATIVE MG/DL
HGB UR QL STRIP: ABNORMAL
KETONES UR STRIP-MCNC: NEGATIVE MG/DL
LEUKOCYTE ESTERASE UR QL STRIP: ABNORMAL
MUCOUS THREADS #/AREA URNS LPF: PRESENT /LPF
NITRATE UR QL: NEGATIVE
PH UR STRIP: 6.5 [PH] (ref 5–7)
PSA SERPL DL<=0.01 NG/ML-MCNC: 5.04 NG/ML (ref 0–6.5)
RBC #/AREA URNS AUTO: ABNORMAL /HPF
SP GR UR STRIP: <=1.005 (ref 1–1.03)
UROBILINOGEN UR STRIP-ACNC: 0.2 E.U./DL
WBC #/AREA URNS AUTO: >100 /HPF
WBC CLUMPS #/AREA URNS HPF: PRESENT /HPF

## 2024-03-07 PROCEDURE — 87086 URINE CULTURE/COLONY COUNT: CPT | Performed by: STUDENT IN AN ORGANIZED HEALTH CARE EDUCATION/TRAINING PROGRAM

## 2024-03-07 PROCEDURE — 51798 US URINE CAPACITY MEASURE: CPT | Performed by: STUDENT IN AN ORGANIZED HEALTH CARE EDUCATION/TRAINING PROGRAM

## 2024-03-07 PROCEDURE — 99203 OFFICE O/P NEW LOW 30 MIN: CPT | Mod: 25 | Performed by: STUDENT IN AN ORGANIZED HEALTH CARE EDUCATION/TRAINING PROGRAM

## 2024-03-07 PROCEDURE — 81001 URINALYSIS AUTO W/SCOPE: CPT | Performed by: STUDENT IN AN ORGANIZED HEALTH CARE EDUCATION/TRAINING PROGRAM

## 2024-03-07 PROCEDURE — 87186 SC STD MICRODIL/AGAR DIL: CPT | Performed by: STUDENT IN AN ORGANIZED HEALTH CARE EDUCATION/TRAINING PROGRAM

## 2024-03-07 PROCEDURE — 36415 COLL VENOUS BLD VENIPUNCTURE: CPT | Performed by: STUDENT IN AN ORGANIZED HEALTH CARE EDUCATION/TRAINING PROGRAM

## 2024-03-07 PROCEDURE — G0103 PSA SCREENING: HCPCS | Performed by: STUDENT IN AN ORGANIZED HEALTH CARE EDUCATION/TRAINING PROGRAM

## 2024-03-07 RX ORDER — SEMAGLUTIDE 0.68 MG/ML
0.5 INJECTION, SOLUTION SUBCUTANEOUS
COMMUNITY
Start: 2024-03-06

## 2024-03-07 RX ORDER — CHLORTHALIDONE 25 MG/1
1 TABLET ORAL DAILY
COMMUNITY
Start: 2024-02-13

## 2024-03-07 ASSESSMENT — PAIN SCALES - GENERAL: PAINLEVEL: NO PAIN (1)

## 2024-03-07 NOTE — PROGRESS NOTES
Chief Complaint:   UTI         History of Present Illness:   Taj Velazquez Sr. is a 73 year old male with a history of T2 DM, tobacco use disorder, HTN, and prediabetes who presents for evaluation of UTI.    The patient presented to the ED on 1/11/2024 with urinary frequency, chills, and fatigue. UA was suspicious for infection. Urine culture grew >100,000 CFUs E.coli. CT abdomen pelvis without contrast was notable for prostatomegaly.     He was then admitted on 1/28/2024 for presumed urosepsis. Urine culture again grew >100,000 CFUs E.coli.     He has been doing well since. He reports increased urinary frequency, but otherwise denies dysuria, gross hematuria, weak stream, and sensation of incomplete bladder emptying.     He has taken tamsulosin 0.4 mg daily for several years.          Past Medical History:     Past Medical History:   Diagnosis Date    Anemia due to blood loss, acute 11/27/2018    Arthritis     Asthma     BMI 34.0-34.9,adult 11/26/2018    Cough secondary to angiotensin converting enzyme inhibitor (ACE-I)     History of anesthesia complications     Prolonged awakening time with spinal surgery    History of tobacco use disorder 11/26/2018    Hypertension     Nasal congestion 11/26/2018    Partial tear of right subscapularis tendon     Pre-diabetes     Rupture of long head biceps tendon, right, initial encounter     Tear of right infraspinatus tendon     Tear of right supraspinatus tendon     Type 2 diabetes mellitus with hyperglycemia, without long-term current use of insulin (H)     Urinary retention 11/27/2018            Past Surgical History:     Past Surgical History:   Procedure Laterality Date    COLONOSCOPY  12/1/2011    Procedure:COLONOSCOPY; Colonoscopy; Surgeon:GWYN MELVIN; Location:[facility]    JOINT REPLACEMENT Left     12/17  knee    RELEASE CARPAL TUNNEL      SPINAL FUSION      [facility] TOTAL KNEE ARTHROPLASTY Left 12/13/2017    Procedure: LEFT TOTAL KNEE ARTHROPLASTY;  Surgeon:  "Endy Baker MD;  Location: [facility];  Service: Orthopedics    [facility] TOTAL KNEE ARTHROPLASTY Right 11/26/2018    Procedure: RIGHT TOTAL KNEE ARTHROPLASTY;  Surgeon: Endy Baker MD;  Location: [facility];  Service: Orthopedics            Medications     Current Outpatient Medications   Medication    amLODIPine (NORVASC) 10 MG tablet    atorvastatin (LIPITOR) 10 MG tablet    blood glucose monitoring (SOFTCLIX) lancets    chlorthalidone (HYGROTON) 25 MG tablet    fluticasone (FLONASE) 50 MCG/ACT nasal spray    [employer] BLOOD GLUCOSE TEST test strip    losartan (COZAAR) 50 MG tablet    OZEMPIC, 0.25 OR 0.5 MG/DOSE, 2 MG/3ML pen    tamsulosin (FLOMAX) 0.4 MG capsule    metFORMIN (GLUCOPHAGE) 1000 MG tablet     No current facility-administered medications for this visit.            Allergies:   Lisinopril and Nkda [no known drug allergy]         Review of Systems:  From intake questionnaire   Negative 14 system review except as noted on HPI, nurse's note.         Physical Exam:   Patient is a 73 year old  male   Vitals: Blood pressure (!) 143/79, pulse 70, temperature 96.9  F (36.1  C), temperature source Tympanic, height 1.803 m (5' 11\"), weight 101.6 kg (224 lb), SpO2 97%.  General Appearance Adult: Alert, no acute distress, oriented.  Lungs: Non-labored breathing.  Heart: No obvious jugular venous distension present.  Neuro: Alert, oriented, speech and mentation normal    PVR: 155 mL      Labs and Pathology:    I personally reviewed all applicable laboratory data and went over findings with patient  Significant for:    CBC RESULTS:  Recent Labs   Lab Test 01/30/24  0501 01/29/24  0507 01/28/24  1205 01/11/24  1529   WBC 3.8* 7.0 10.1 10.9   HGB 11.9* 11.7* 13.8 13.7    171 207 109*        BMP RESULTS:  Recent Labs   Lab Test 01/30/24  1206 01/30/24  0750 01/30/24  0501 01/30/24  0208 01/29/24  0757 01/29/24  0507 01/28/24  1650 01/28/24  1213 01/28/24  1157 01/11/24  1529 01/06/21  0758 " 01/06/21  0659 01/05/21  0810 01/05/21  0644 01/04/21  0759 01/04/21  0647 01/03/21  0739 01/03/21  0625   NA  --   --  135  --   --  131*  --  132*  --  130*  --  139  --  141  --  136  --  133*   POTASSIUM  --   --  4.1  --   --  4.1  --  4.3  --  4.1  --  4.8  --  4.0  --  4.2  --  4.5   CHLORIDE  --   --  99  --   --  95*  --  98  --  95*  --  104  --  104  --  100  --  96*   CO2  --   --  22  --   --  23  --  20*  --  22  --  29  --  28  --  28  --  28   ANIONGAP  --   --  14  --   --  13  --  14  --  13  --  6  --  9  --  8  --  9   * 214* 226* 220*   < > 255*   < > 310*   < > 331*   < > 127*   < > 134*   < > 285*   < > 313*   BUN  --   --  11.9  --   --  19.6  --  15.8  --  20.3  --  24*  --  26*  --  26*  --  22   CR  --   --  0.68  --   --  0.89  --  0.75  --  1.00  --  0.52*  --  0.76  --  0.69*  --  0.80   GFRESTIMATED  --   --  >90  --   --  >90  --  >90  --  80  --  >60  --  >60  --  >60  --  >60   GFRESTBLACK  --   --   --   --   --   --   --   --   --   --   --  >60  --  >60  --  >60  --  >60   AUGUSTIN  --   --  8.7*  --   --  8.7*  --  9.5  --  8.9  --  8.9  --  8.6  --  8.8  --  8.6    < > = values in this interval not displayed.       UA RESULTS:   Recent Labs   Lab Test 01/28/24  1309 01/11/24  1529 11/28/18  1019   SG 1.021 1.024 1.015   URINEPH 6.0 5.0 6.0   NITRITE Positive* Negative Negative   RBCU 12* 21*  --    WBCU >182* 180*  --          Imaging:    I personally reviewed all applicable imaging and went over findings with patient.  Significant for:    Abd/pelvis CT - no contrast - Stone Protocol    Narrative    EXAM: CT ABDOMEN PELVIS W/O CONTRAST  LOCATION: [facility]  DATE: 1/11/2024    INDICATION: Urinary incontinence, hematuria, and UTI. Evaluate for obstructing stone vs other acute  process.  COMPARISON: None available.  TECHNIQUE: CT scan of the abdomen and pelvis was performed without IV contrast. Multiplanar reformats were obtained. Dose reduction  techniques were used.  CONTRAST: None.    FINDINGS:   LOWER CHEST: Minimal bibasilar scarring/fibrosis. No airspace consolidation or pleural effusion.    HEPATOBILIARY: Diffuse hepatic steatosis. No evidence of biliary obstruction.    PANCREAS: Normal.    SPLEEN: Normal.    ADRENAL GLANDS: Normal.    KIDNEYS/BLADDER: Simple-appearing left renal cyst, no specific follow-up recommended. No nephroureterolithiasis or hydronephrosis. Urinary bladder demonstrates diffuse wall thickening with surrounding fat stranding.    BOWEL: No obstruction or inflammatory change. Normal appendix.    LYMPH NODES: No suspicious lymphadenopathy.    VASCULATURE: Scattered calcified atherosclerosis.    PELVIC ORGANS: Prostatomegaly.    MUSCULOSKELETAL: No acute bony abnormality.      Impression    IMPRESSION:   1.  Findings consistent with cystitis.  2.  No nephroureterolithiasis or hydronephrosis.  3.  Prostatomegaly.  4.  Hepatic steatosis.              Assessment and Plan:     Assessment: 73 year old male seen in evaluation for UTIs. The patient presented to the ED on 1/11/2024 with urinary frequency, chills, and fatigue. UA was suspicious for infection. Urine culture grew >100,000 CFUs E.coli. CT abdomen pelvis without contrast was notable for prostatomegaly.     He was then admitted on 1/28/2024 for presumed urosepsis. Urine culture again grew >100,000 CFUs E.coli.     He has been doing well since. He reports increased urinary frequency, but otherwise denies dysuria, gross hematuria, weak stream, and sensation of incomplete bladder emptying.     He has taken tamsulosin 0.4 mg daily for several years. He has been working with his PCP to manage his blood sugars.     We discussed elevated glucose levels and prostatomegaly as possible causes for his UTIs. I recommended cystoscopy for further evaluation. The patient declined at this time since he has been doing well, but would be agreeable if he develops another UTI.     Plan:  UA today.    PSA for prostate cancer screening.   Continue tamsulosin 0.4 mg daily.   Follow up for another UTI or bothersome urinary symptoms.     CODY WILHELM PA-C  Department of Urology

## 2024-03-07 NOTE — NURSING NOTE
Active order to obtain bladder scan? Yes   Name of ordering provider:  Rosa Tobin  Bladder scan preformed post void Yes about 10 minutes ago.  Bladder scan reveled 153ML  Provider notified?  Yes    Colleen WILLETT CMA

## 2024-03-08 ENCOUNTER — TELEPHONE (OUTPATIENT)
Dept: UROLOGY | Facility: CLINIC | Age: 74
End: 2024-03-08
Payer: COMMERCIAL

## 2024-03-08 DIAGNOSIS — N39.0 URINARY TRACT INFECTION WITHOUT HEMATURIA, SITE UNSPECIFIED: Primary | ICD-10-CM

## 2024-03-08 LAB — BACTERIA UR CULT: ABNORMAL

## 2024-03-08 RX ORDER — CIPROFLOXACIN 500 MG/1
500 TABLET, FILM COATED ORAL 2 TIMES DAILY
Qty: 14 TABLET | Refills: 0 | Status: SHIPPED | OUTPATIENT
Start: 2024-03-08 | End: 2024-03-15

## 2024-03-08 NOTE — TELEPHONE ENCOUNTER
Discussed with patient that urine testing shows another UTI. I will send antibiotics to his pharmacy based on culture results. I would recommend cystoscopy for further evaluation. The patient is in agreement.     PSA was elevated above baseline, likely due to UTI. Will need to recheck in the future.

## 2024-03-11 NOTE — TELEPHONE ENCOUNTER
Called patient to scheduled Cystoscopy with Dr. Warren and lab appointment to leave urine sample the Friday before procedure. Patient schedule. Patient wondering about his PSA.    Per Rosa Tobin his PSA is elevated likely due to UTI and will recheck once UTI's are figured out.    Colleen WILLETT CMA 03/11/24 10:36 AM

## 2024-04-19 ENCOUNTER — LAB (OUTPATIENT)
Dept: LAB | Facility: CLINIC | Age: 74
End: 2024-04-19
Payer: COMMERCIAL

## 2024-04-19 DIAGNOSIS — N39.0 URINARY TRACT INFECTION WITHOUT HEMATURIA, SITE UNSPECIFIED: ICD-10-CM

## 2024-04-19 LAB
ALBUMIN UR-MCNC: NEGATIVE MG/DL
APPEARANCE UR: CLEAR
BILIRUB UR QL STRIP: NEGATIVE
COLOR UR AUTO: YELLOW
GLUCOSE UR STRIP-MCNC: NEGATIVE MG/DL
HGB UR QL STRIP: NEGATIVE
KETONES UR STRIP-MCNC: NEGATIVE MG/DL
LEUKOCYTE ESTERASE UR QL STRIP: NEGATIVE
NITRATE UR QL: NEGATIVE
PH UR STRIP: 6 [PH] (ref 5–7)
SP GR UR STRIP: 1.02 (ref 1–1.03)
UROBILINOGEN UR STRIP-ACNC: 0.2 E.U./DL

## 2024-04-19 PROCEDURE — 81003 URINALYSIS AUTO W/O SCOPE: CPT

## 2024-04-24 ENCOUNTER — OFFICE VISIT (OUTPATIENT)
Dept: UROLOGY | Facility: CLINIC | Age: 74
End: 2024-04-24
Payer: COMMERCIAL

## 2024-04-24 VITALS
SYSTOLIC BLOOD PRESSURE: 121 MMHG | WEIGHT: 220 LBS | BODY MASS INDEX: 30.68 KG/M2 | TEMPERATURE: 97.4 F | DIASTOLIC BLOOD PRESSURE: 77 MMHG | HEART RATE: 87 BPM

## 2024-04-24 DIAGNOSIS — F10.29 ALCOHOL DEPENDENCE WITH UNSPECIFIED ALCOHOL-INDUCED DISORDER (H): ICD-10-CM

## 2024-04-24 DIAGNOSIS — R97.20 ELEVATED PROSTATE SPECIFIC ANTIGEN (PSA): Primary | ICD-10-CM

## 2024-04-24 DIAGNOSIS — R33.9 RETENTION OF URINE, UNSPECIFIED: ICD-10-CM

## 2024-04-24 DIAGNOSIS — Z12.5 ENCOUNTER FOR SCREENING FOR MALIGNANT NEOPLASM OF PROSTATE: ICD-10-CM

## 2024-04-24 DIAGNOSIS — N39.0 RECURRENT UTI: ICD-10-CM

## 2024-04-24 PROBLEM — F10.20 ALCOHOL DEPENDENCE (H): Status: ACTIVE | Noted: 2024-04-24

## 2024-04-24 PROCEDURE — 51798 US URINE CAPACITY MEASURE: CPT | Performed by: STUDENT IN AN ORGANIZED HEALTH CARE EDUCATION/TRAINING PROGRAM

## 2024-04-24 PROCEDURE — 52000 CYSTOURETHROSCOPY: CPT | Performed by: STUDENT IN AN ORGANIZED HEALTH CARE EDUCATION/TRAINING PROGRAM

## 2024-04-24 PROCEDURE — 99212 OFFICE O/P EST SF 10 MIN: CPT | Mod: 25 | Performed by: STUDENT IN AN ORGANIZED HEALTH CARE EDUCATION/TRAINING PROGRAM

## 2024-04-24 RX ADMIN — Medication 500 MG: at 12:15

## 2024-04-24 ASSESSMENT — PAIN SCALES - GENERAL: PAINLEVEL: NO PAIN (0)

## 2024-04-24 NOTE — PROGRESS NOTES
Reason for cystoscopy:    Brief History:  The patient presented to the ED on 1/11/2024 with urinary frequency, chills, and fatigue. UA was suspicious for infection. Urine culture grew >100,000 CFUs E.coli. CT abdomen pelvis without contrast was notable for prostatomegaly.     He was then admitted on 1/28/2024 for presumed urosepsis. Urine culture again grew >100,000 CFUs E.coli.     He has been doing well since. He reports increased urinary frequency, but otherwise denies dysuria, gross hematuria, weak stream, and sensation of incomplete bladder emptying.     He reports that his symptoms started after the knee surgery in the past.  He is found that he was not emptying his bladder well.  He was started on Flomax. He maintains a regimen of double voiding. He reports nocturia, waking up twice per night, following an 8-hour sleep cycle.    Imaging  I reviewed his CT scan and his prostate is around 70 g in size.      The following distinct labs were reviewed   Most Recent 3 CBC's:  Recent Labs   Lab Test 01/30/24  0501 01/29/24  0507 01/28/24  1205   WBC 3.8* 7.0 10.1   HGB 11.9* 11.7* 13.8   MCV 84 85 83    171 207     Most Recent 3 BMP's:  Recent Labs   Lab Test 01/30/24  1206 01/30/24  0750 01/30/24  0501 01/29/24  0757 01/29/24  0507 01/28/24  1650 01/28/24  1213   NA  --   --  135  --  131*  --  132*   POTASSIUM  --   --  4.1  --  4.1  --  4.3   CHLORIDE  --   --  99  --  95*  --  98   CO2  --   --  22  --  23  --  20*   BUN  --   --  11.9  --  19.6  --  15.8   CR  --   --  0.68  --  0.89  --  0.75   ANIONGAP  --   --  14  --  13  --  14   AUGUSTIN  --   --  8.7*  --  8.7*  --  9.5   * 214* 226*   < > 255*   < > 310*    < > = values in this interval not displayed.       PSA 5.04  PSA density 0.07    CYSTOSCOPY  We discussed the risks and benefits of the procedure which include risk of bleeding and infection.  Informed consent was obtained, the patient was prepped and draped in the standard sterile fashion.   The 15 Tajik flexible cystoscope was inserted through the urethral meatus.      The anterior urethra was:  normal without stricture.    The external sphincter was  appropriately coapted.   The prostatic urethra demonstrated bilobar hypertrophy with coapting lobes.    The bladder neck was  nonocclusive.    The bladder was  unremarkable for tumors, erythema or stones.    The ureteral orifices were identified on each side in orthotopic position    There were mild trabeculations.    On retroflexion there was the usual bladder neck hyperemia.    There is no intravesical protrusion of the prostate.                          We did a flow rate and a PVR.  He voided about 500 mL with an excellent stream with a Q-Felix near 30 mL/s.  His postvoid residual measured by ultrasound was 20 mL.    EVALUATION AND MANAGEMENT   Upon completion of the cystoscopy the patient was brought to a separate examination room to discuss findings of above testing, review available treatment options and make a plan for further steps in care     SUMMARY:    Recurrent urinary tract infections  I suspect that this could be due to incomplete emptying and he had an elevated PVR in the last visit.  By today it shows that he has an excellent stream and empties to completion after voiding nearly 500 mL.  May be perhaps the Flomax is helping him. The patient will continue with Flomax, as it has been beneficial.  the potential use of finasteride to shrink the prostate was discussed, including the associated side effects, risks, and benefits. The patient was also advised to continue double voiding.    PSA Screening  A follow-up PSA test will be conducted in 6 months to monitor the PSA levels. At this juncture, no intervention is deemed necessary for his prostate.  His PSA is 5.04 but a PSA density of 0.07.  There is no other prior PSAs to compare with.  Saw this point we will plan to monitor the PSA.  Should the patient's PSA levels increase, an MRI of the  prostate will be performed to rule out any suspicions for cancer. However, if the PSA levels show improvement or remain stable, we will continue to monitor the PSA levels.    15 minutes spent were spent on the E/M portion of the visit in a separate examination room after cystoscopy discussing findings, available treatment options and next steps in care.         Consent was obtained from the patient to use an AI documentation tool in the creation of  this note

## 2024-04-24 NOTE — NURSING NOTE
"Initial /77 (BP Location: Right arm, Patient Position: Chair, Cuff Size: Adult Large)   Pulse 87   Temp 97.4  F (36.3  C) (Tympanic)   Wt 99.8 kg (220 lb)   BMI 30.68 kg/m   Estimated body mass index is 30.68 kg/m  as calculated from the following:    Height as of 3/7/24: 1.803 m (5' 11\").    Weight as of this encounter: 99.8 kg (220 lb). .  Eun Dang LPN    "

## 2024-10-18 ENCOUNTER — LAB (OUTPATIENT)
Dept: LAB | Facility: CLINIC | Age: 74
End: 2024-10-18
Payer: COMMERCIAL

## 2024-10-18 DIAGNOSIS — Z12.5 ENCOUNTER FOR SCREENING FOR MALIGNANT NEOPLASM OF PROSTATE: ICD-10-CM

## 2024-10-18 DIAGNOSIS — R97.20 ELEVATED PROSTATE SPECIFIC ANTIGEN (PSA): ICD-10-CM

## 2024-10-18 LAB — PSA SERPL DL<=0.01 NG/ML-MCNC: 1.73 NG/ML (ref 0–6.5)

## 2024-10-18 PROCEDURE — 36415 COLL VENOUS BLD VENIPUNCTURE: CPT

## 2024-10-18 PROCEDURE — G0103 PSA SCREENING: HCPCS

## 2024-10-21 ENCOUNTER — OFFICE VISIT (OUTPATIENT)
Dept: UROLOGY | Facility: CLINIC | Age: 74
End: 2024-10-21
Payer: COMMERCIAL

## 2024-10-21 VITALS
HEIGHT: 71 IN | SYSTOLIC BLOOD PRESSURE: 131 MMHG | BODY MASS INDEX: 30.66 KG/M2 | DIASTOLIC BLOOD PRESSURE: 74 MMHG | TEMPERATURE: 97.8 F | HEART RATE: 75 BPM | WEIGHT: 219 LBS | OXYGEN SATURATION: 94 %

## 2024-10-21 DIAGNOSIS — Z12.5 SCREENING FOR PROSTATE CANCER: ICD-10-CM

## 2024-10-21 DIAGNOSIS — R33.9 RETENTION OF URINE, UNSPECIFIED: Primary | ICD-10-CM

## 2024-10-21 PROCEDURE — 99213 OFFICE O/P EST LOW 20 MIN: CPT | Mod: 25 | Performed by: STUDENT IN AN ORGANIZED HEALTH CARE EDUCATION/TRAINING PROGRAM

## 2024-10-21 PROCEDURE — 51798 US URINE CAPACITY MEASURE: CPT | Performed by: STUDENT IN AN ORGANIZED HEALTH CARE EDUCATION/TRAINING PROGRAM

## 2024-10-21 ASSESSMENT — PAIN SCALES - GENERAL: PAINLEVEL: NO PAIN (0)

## 2024-10-21 NOTE — NURSING NOTE
Active order to obtain bladder scan? Yes   Name of ordering provider:  Rosa Tobin  Bladder scan preformed post void yes.  Bladder scan revealed 13ML  Provider notified?  Yes    Colleen WILLETT CMA

## 2024-10-21 NOTE — PROGRESS NOTES
"    UROLOGY FOLLOW-UP NOTE          Chief Complaint:   Today I had the pleasure of seeing Mr. Taj Velazquez Sr. in follow-up for a chief complaint of LUTS.          Interval Update   Taj Velazquez Sr. is a very pleasant 74 year old male with a history of T2 DM, tobacco use disorder, HTN, and prediabetes.     Brief History: Mr. Taj Velazquez Sr. has followed with urology for multiple UTIs. He had a cystoscopy on 4/24/2024 which was notable for bilateral prostate hypertrophy. His qMax was 30 mL/s and he was advised to continue tamsulosin.     Today's notes: He is doing well. He has not had any new UTIs. His baseline urinary symptoms          Physical Exam:   Patient is a 74 year old  male   Vitals: Blood pressure 131/74, pulse 75, temperature 97.8  F (36.6  C), temperature source Tympanic, height 1.803 m (5' 11\"), weight 99.3 kg (219 lb), SpO2 94%.  General: Alert and oriented x 3, no acute distress.  Respiratory: Non-labored breathing.  Cardiac: Regular rate.    PVR: 13 mL        Labs and Pathology:    I personally reviewed all applicable laboratory data and went over findings with patient  Significant for:    CBC RESULTS:  Recent Labs   Lab Test 01/30/24  0501 01/29/24  0507 01/28/24  1205 01/11/24  1529   WBC 3.8* 7.0 10.1 10.9   HGB 11.9* 11.7* 13.8 13.7    171 207 109*        BMP RESULTS:  Recent Labs   Lab Test 01/30/24  1206 01/30/24  0750 01/30/24  0501 01/30/24  0208 01/29/24  0757 01/29/24  0507 01/28/24  1650 01/28/24  1213 01/28/24  1157 01/11/24  1529 01/06/21  0758 01/06/21  0659 01/05/21  0810 01/05/21  0644 01/04/21  0759 01/04/21  0647 01/03/21  0739 01/03/21  0625   NA  --   --  135  --   --  131*  --  132*  --  130*  --  139  --  141  --  136  --  133*   POTASSIUM  --   --  4.1  --   --  4.1  --  4.3  --  4.1  --  4.8  --  4.0  --  4.2  --  4.5   CHLORIDE  --   --  99  --   --  95*  --  98  --  95*  --  104  --  104  --  100  --  96*   CO2  --   --  22  --   --  23  --  20*  --  22  --  " 29  --  28  --  28  --  28   ANIONGAP  --   --  14  --   --  13  --  14  --  13  --  6  --  9  --  8  --  9   * 214* 226* 220*   < > 255*   < > 310*   < > 331*   < > 127*   < > 134*   < > 285*   < > 313*   BUN  --   --  11.9  --   --  19.6  --  15.8  --  20.3  --  24*  --  26*  --  26*  --  22   CR  --   --  0.68  --   --  0.89  --  0.75  --  1.00  --  0.52*  --  0.76  --  0.69*  --  0.80   GFRESTIMATED  --   --  >90  --   --  >90  --  >90  --  80  --  >60  --  >60  --  >60  --  >60   GFRESTBLACK  --   --   --   --   --   --   --   --   --   --   --  >60  --  >60  --  >60  --  >60   AUGUSTIN  --   --  8.7*  --   --  8.7*  --  9.5  --  8.9  --  8.9  --  8.6  --  8.8  --  8.6    < > = values in this interval not displayed.       UA RESULTS:   Recent Labs   Lab Test 04/19/24  0845 03/07/24  0839 01/28/24  1309 01/11/24  1529   SG 1.020 <=1.005 1.021 1.024   URINEPH 6.0 6.5 6.0 5.0   NITRITE Negative Negative Positive* Negative   RBCU  --  5-10* 12* 21*   WBCU  --  >100* >182* 180*       PSA RESULTS  Prostate Specific Antigen Screen   Date Value Ref Range Status   10/18/2024 1.73 0.00 - 6.50 ng/mL Final   03/07/2024 5.04 0.00 - 6.50 ng/mL Final            Assessment/Plan   74 year old male seen in follow up for LUTS/history of UTIs. He had a cystoscopy on 4/24/2024 which was notable for bilateral prostate hypertrophy. His qMax was 30 mL/s and he was advised to continue tamsulosin. He is doing very well and has not had any more UTIs.     His PSA decreased from 5.04 ng/mL on 3/07/2024 to 1.73 ng/mL on 10/18/2024. We will continue with annual checks.     Plan:  Continue tamsulosin 0.4 mg daily.   PSA and follow up in one year, sooner if concerns.          Past Medical History:     Past Medical History:   Diagnosis Date    Anemia due to blood loss, acute 11/27/2018    Arthritis     Asthma     BMI 34.0-34.9,adult 11/26/2018    Cough secondary to angiotensin converting enzyme inhibitor (ACE-I)     History of anesthesia  complications     Prolonged awakening time with spinal surgery    History of tobacco use disorder 11/26/2018    Hypertension     Nasal congestion 11/26/2018    Partial tear of right subscapularis tendon     Pre-diabetes     Rupture of long head biceps tendon, right, initial encounter     Tear of right infraspinatus tendon     Tear of right supraspinatus tendon     Type 2 diabetes mellitus with hyperglycemia, without long-term current use of insulin (H)     Urinary retention 11/27/2018            Past Surgical History:     Past Surgical History:   Procedure Laterality Date    COLONOSCOPY  12/1/2011    Procedure:COLONOSCOPY; Colonoscopy; Surgeon:GWYN MELVIN; Location:[facility]    JOINT REPLACEMENT Left     12/17  knee    RELEASE CARPAL TUNNEL      SPINAL FUSION      [facility] TOTAL KNEE ARTHROPLASTY Left 12/13/2017    Procedure: LEFT TOTAL KNEE ARTHROPLASTY;  Surgeon: Endy Baker MD;  Location: [facility];  Service: Orthopedics    [facility] TOTAL KNEE ARTHROPLASTY Right 11/26/2018    Procedure: RIGHT TOTAL KNEE ARTHROPLASTY;  Surgeon: Endy Baker MD;  Location: [facility];  Service: Orthopedics            Medications     Current Outpatient Medications   Medication Sig Dispense Refill    tamsulosin (FLOMAX) 0.4 MG capsule Take 0.4 mg by mouth daily      amLODIPine (NORVASC) 10 MG tablet Take 1 tablet by mouth daily      atorvastatin (LIPITOR) 10 MG tablet Take 10 mg by mouth daily      blood glucose monitoring (SOFTCLIX) lancets 1 each daily      chlorthalidone (HYGROTON) 25 MG tablet Take 1 tablet by mouth daily      fluticasone (FLONASE) 50 MCG/ACT nasal spray Spray 1 spray into both nostrils daily as needed for rhinitis      [employer] BLOOD GLUCOSE TEST test strip 1 strip by In Vitro route daily      losartan (COZAAR) 50 MG tablet Take 1 tablet by mouth daily      metFORMIN (GLUCOPHAGE) 1000 MG tablet Take 1 tablet (1,000 mg) by mouth 2 times daily (with meals) for 30 days 60 tablet 0    OZEMPIC, 0.25  OR 0.5 MG/DOSE, 2 MG/3ML pen Inject 0.5 mg Subcutaneous       No current facility-administered medications for this visit.            Family History:     Family History   Problem Relation Age of Onset    Cancer Mother     Diabetes Father     Hypertension Father     Diabetes Sister     Throat cancer Brother     Hypertension Paternal Uncle     Heart Disease Paternal Uncle             Social History:     Social History     Socioeconomic History    Marital status:      Spouse name: Not on file    Number of children: Not on file    Years of education: Not on file    Highest education level: Not on file   Occupational History    Not on file   Tobacco Use    Smoking status: Former     Current packs/day: 0.00     Average packs/day: 1 pack/day for 15.0 years (15.0 ttl pk-yrs)     Types: Cigarettes     Start date: 1963     Quit date: 1978     Years since quittin.5    Smokeless tobacco: Never   Substance and Sexual Activity    Alcohol use: Yes     Alcohol/week: 28.0 standard drinks of alcohol     Types: 28 Standard drinks or equivalent per week     Comment: occas    Drug use: No    Sexual activity: Not on file   Other Topics Concern    Not on file   Social History Narrative    Not on file     Social Determinants of Health     Financial Resource Strain: Low Risk  (2023)    [facility]    Financial Resource Strain     Difficulty of Paying Living Expenses: 3     Difficulty of Paying Living Expenses: Not on file   Food Insecurity: No Food Insecurity (2023)    [facility]    Food Insecurity     Worried About Running Out of Food in the Last Year: 1   Transportation Needs: No Transportation Needs (2023)    [email or web address], [facility]     Transportation Needs     Lack of Transportation (Medical): 1   Physical Activity: Not on file   Stress: Not on file   Social Connections: Unknown (8/1/2024)    Received from [facility]    Social Connections     Frequency of Communication with Friends and Family: Not on file   Interpersonal Safety: Not on file   Housing Stability: Low Risk  (7/24/2023)    [facility]    Housing Stability     Unable to Pay for Housing in the Last Year: 1            Allergies:   Lisinopril         Review of Systems:  From intake questionnaire   Negative 14 system review except as noted on HPI, nurse's note.        CODY WILHELM PA-C  Department of Urology

## 2024-10-21 NOTE — NURSING NOTE
"Initial /74   Pulse 75   Temp 97.8  F (36.6  C) (Tympanic)   Ht 1.803 m (5' 11\")   Wt 99.3 kg (219 lb)   SpO2 94%   BMI 30.54 kg/m   Estimated body mass index is 30.54 kg/m  as calculated from the following:    Height as of this encounter: 1.803 m (5' 11\").    Weight as of this encounter: 99.3 kg (219 lb). .  Colleen WILLETT CMA 10/21/24 9:48 AM  "